# Patient Record
Sex: FEMALE | Race: WHITE | NOT HISPANIC OR LATINO | Employment: UNEMPLOYED | URBAN - METROPOLITAN AREA
[De-identification: names, ages, dates, MRNs, and addresses within clinical notes are randomized per-mention and may not be internally consistent; named-entity substitution may affect disease eponyms.]

---

## 2017-12-31 ENCOUNTER — APPOINTMENT (OUTPATIENT)
Dept: RADIOLOGY | Facility: MEDICAL CENTER | Age: 27
DRG: 638 | End: 2017-12-31
Attending: EMERGENCY MEDICINE
Payer: MEDICAID

## 2017-12-31 ENCOUNTER — RESOLUTE PROFESSIONAL BILLING HOSPITAL PROF FEE (OUTPATIENT)
Dept: HOSPITALIST | Facility: MEDICAL CENTER | Age: 27
End: 2017-12-31
Payer: MEDICAID

## 2017-12-31 ENCOUNTER — HOSPITAL ENCOUNTER (INPATIENT)
Facility: MEDICAL CENTER | Age: 27
LOS: 3 days | DRG: 638 | End: 2018-01-03
Attending: EMERGENCY MEDICINE | Admitting: HOSPITALIST
Payer: MEDICAID

## 2017-12-31 PROBLEM — Z91.148 NONCOMPLIANCE WITH MEDICATIONS: Status: ACTIVE | Noted: 2017-12-31

## 2017-12-31 PROBLEM — E11.10 DKA (DIABETIC KETOACIDOSES): Status: ACTIVE | Noted: 2017-12-31

## 2017-12-31 PROBLEM — R00.0 TACHYCARDIA: Status: ACTIVE | Noted: 2017-12-31

## 2017-12-31 LAB
ALBUMIN SERPL BCP-MCNC: 3.5 G/DL (ref 3.2–4.9)
ALBUMIN SERPL BCP-MCNC: 4.5 G/DL (ref 3.2–4.9)
ALBUMIN/GLOB SERPL: 1.1 G/DL
ALBUMIN/GLOB SERPL: 1.5 G/DL
ALP SERPL-CCNC: 118 U/L (ref 30–99)
ALP SERPL-CCNC: 121 U/L (ref 30–99)
ALT SERPL-CCNC: 10 U/L (ref 2–50)
ALT SERPL-CCNC: 11 U/L (ref 2–50)
ANION GAP SERPL CALC-SCNC: 15 MMOL/L (ref 0–11.9)
ANION GAP SERPL CALC-SCNC: 18 MMOL/L (ref 0–11.9)
ANION GAP SERPL CALC-SCNC: 20 MMOL/L (ref 0–11.9)
APPEARANCE UR: CLEAR
AST SERPL-CCNC: 13 U/L (ref 12–45)
AST SERPL-CCNC: 15 U/L (ref 12–45)
BASOPHILS # BLD AUTO: 0.4 % (ref 0–1.8)
BASOPHILS # BLD AUTO: 0.5 % (ref 0–1.8)
BASOPHILS # BLD: 0.02 K/UL (ref 0–0.12)
BASOPHILS # BLD: 0.03 K/UL (ref 0–0.12)
BILIRUB SERPL-MCNC: 0.2 MG/DL (ref 0.1–1.5)
BILIRUB SERPL-MCNC: 0.3 MG/DL (ref 0.1–1.5)
BNP SERPL-MCNC: 3 PG/ML (ref 0–100)
BUN SERPL-MCNC: 10 MG/DL (ref 8–22)
BUN SERPL-MCNC: 11 MG/DL (ref 8–22)
BUN SERPL-MCNC: 8 MG/DL (ref 8–22)
CALCIUM SERPL-MCNC: 7.1 MG/DL (ref 8.5–10.5)
CALCIUM SERPL-MCNC: 8.1 MG/DL (ref 8.5–10.5)
CALCIUM SERPL-MCNC: 8.9 MG/DL (ref 8.5–10.5)
CHLORIDE SERPL-SCNC: 100 MMOL/L (ref 96–112)
CHLORIDE SERPL-SCNC: 104 MMOL/L (ref 96–112)
CHLORIDE SERPL-SCNC: 111 MMOL/L (ref 96–112)
CO2 SERPL-SCNC: 10 MMOL/L (ref 20–33)
CO2 SERPL-SCNC: 11 MMOL/L (ref 20–33)
CO2 SERPL-SCNC: 9 MMOL/L (ref 20–33)
COLOR UR AUTO: YELLOW
CREAT SERPL-MCNC: 0.7 MG/DL (ref 0.5–1.4)
CREAT SERPL-MCNC: 0.76 MG/DL (ref 0.5–1.4)
CREAT SERPL-MCNC: 1 MG/DL (ref 0.5–1.4)
DEPRECATED D DIMER PPP IA-ACNC: 211 NG/ML(D-DU)
EKG IMPRESSION: NORMAL
EOSINOPHIL # BLD AUTO: 0.01 K/UL (ref 0–0.51)
EOSINOPHIL # BLD AUTO: 0.01 K/UL (ref 0–0.51)
EOSINOPHIL NFR BLD: 0.2 % (ref 0–6.9)
EOSINOPHIL NFR BLD: 0.2 % (ref 0–6.9)
ERYTHROCYTE [DISTWIDTH] IN BLOOD BY AUTOMATED COUNT: 43.8 FL (ref 35.9–50)
ERYTHROCYTE [DISTWIDTH] IN BLOOD BY AUTOMATED COUNT: 43.8 FL (ref 35.9–50)
FLUAV RNA SPEC QL NAA+PROBE: POSITIVE
FLUBV RNA SPEC QL NAA+PROBE: NEGATIVE
GFR SERPL CREATININE-BSD FRML MDRD: >60 ML/MIN/1.73 M 2
GLOBULIN SER CALC-MCNC: 3.1 G/DL (ref 1.9–3.5)
GLOBULIN SER CALC-MCNC: 3.2 G/DL (ref 1.9–3.5)
GLUCOSE BLD-MCNC: 127 MG/DL (ref 65–99)
GLUCOSE BLD-MCNC: 152 MG/DL (ref 65–99)
GLUCOSE BLD-MCNC: 201 MG/DL (ref 65–99)
GLUCOSE SERPL-MCNC: 213 MG/DL (ref 65–99)
GLUCOSE SERPL-MCNC: 284 MG/DL (ref 65–99)
GLUCOSE SERPL-MCNC: 371 MG/DL (ref 65–99)
GLUCOSE UR QL STRIP.AUTO: 500 MG/DL
HCG UR QL: NEGATIVE
HCT VFR BLD AUTO: 42.6 % (ref 37–47)
HCT VFR BLD AUTO: 46.6 % (ref 37–47)
HGB BLD-MCNC: 13 G/DL (ref 12–16)
HGB BLD-MCNC: 14.3 G/DL (ref 12–16)
IMM GRANULOCYTES # BLD AUTO: 0.01 K/UL (ref 0–0.11)
IMM GRANULOCYTES # BLD AUTO: 0.01 K/UL (ref 0–0.11)
IMM GRANULOCYTES NFR BLD AUTO: 0.2 % (ref 0–0.9)
IMM GRANULOCYTES NFR BLD AUTO: 0.2 % (ref 0–0.9)
KETONES UR QL STRIP.AUTO: >=160 MG/DL
LEUKOCYTE ESTERASE UR QL STRIP.AUTO: NEGATIVE
LYMPHOCYTES # BLD AUTO: 1.49 K/UL (ref 1–4.8)
LYMPHOCYTES # BLD AUTO: 2.43 K/UL (ref 1–4.8)
LYMPHOCYTES NFR BLD: 27 % (ref 22–41)
LYMPHOCYTES NFR BLD: 45 % (ref 22–41)
MAGNESIUM SERPL-MCNC: 1.5 MG/DL (ref 1.5–2.5)
MAGNESIUM SERPL-MCNC: 1.7 MG/DL (ref 1.5–2.5)
MCH RBC QN AUTO: 25.4 PG (ref 27–33)
MCH RBC QN AUTO: 25.5 PG (ref 27–33)
MCHC RBC AUTO-ENTMCNC: 30.5 G/DL (ref 33.6–35)
MCHC RBC AUTO-ENTMCNC: 30.7 G/DL (ref 33.6–35)
MCV RBC AUTO: 82.8 FL (ref 81.4–97.8)
MCV RBC AUTO: 83.7 FL (ref 81.4–97.8)
MONOCYTES # BLD AUTO: 0.26 K/UL (ref 0–0.85)
MONOCYTES # BLD AUTO: 0.39 K/UL (ref 0–0.85)
MONOCYTES NFR BLD AUTO: 4.7 % (ref 0–13.4)
MONOCYTES NFR BLD AUTO: 7.2 % (ref 0–13.4)
NEUTROPHILS # BLD AUTO: 2.54 K/UL (ref 2–7.15)
NEUTROPHILS # BLD AUTO: 3.72 K/UL (ref 2–7.15)
NEUTROPHILS NFR BLD: 47 % (ref 44–72)
NEUTROPHILS NFR BLD: 67.4 % (ref 44–72)
NITRITE UR QL STRIP.AUTO: NEGATIVE
NRBC # BLD AUTO: 0 K/UL
NRBC # BLD AUTO: 0 K/UL
NRBC BLD-RTO: 0 /100 WBC
NRBC BLD-RTO: 0 /100 WBC
PH UR STRIP.AUTO: 5 [PH]
PHOSPHATE SERPL-MCNC: 2.4 MG/DL (ref 2.5–4.5)
PHOSPHATE SERPL-MCNC: 3.1 MG/DL (ref 2.5–4.5)
PLATELET # BLD AUTO: 238 K/UL (ref 164–446)
PLATELET # BLD AUTO: 263 K/UL (ref 164–446)
PMV BLD AUTO: 10 FL (ref 9–12.9)
PMV BLD AUTO: 9.5 FL (ref 9–12.9)
POTASSIUM SERPL-SCNC: 3.8 MMOL/L (ref 3.6–5.5)
POTASSIUM SERPL-SCNC: 4 MMOL/L (ref 3.6–5.5)
POTASSIUM SERPL-SCNC: 4.4 MMOL/L (ref 3.6–5.5)
PROT SERPL-MCNC: 6.7 G/DL (ref 6–8.2)
PROT SERPL-MCNC: 7.6 G/DL (ref 6–8.2)
PROT UR QL STRIP: 100 MG/DL
RBC # BLD AUTO: 5.09 M/UL (ref 4.2–5.4)
RBC # BLD AUTO: 5.63 M/UL (ref 4.2–5.4)
RBC UR QL AUTO: ABNORMAL
SODIUM SERPL-SCNC: 130 MMOL/L (ref 135–145)
SODIUM SERPL-SCNC: 133 MMOL/L (ref 135–145)
SODIUM SERPL-SCNC: 135 MMOL/L (ref 135–145)
SP GR UR: 1.02
TROPONIN I SERPL-MCNC: <0.01 NG/ML (ref 0–0.04)
TSH SERPL DL<=0.005 MIU/L-ACNC: 1.46 UIU/ML (ref 0.38–5.33)
WBC # BLD AUTO: 5.4 K/UL (ref 4.8–10.8)
WBC # BLD AUTO: 5.5 K/UL (ref 4.8–10.8)

## 2017-12-31 PROCEDURE — 84100 ASSAY OF PHOSPHORUS: CPT

## 2017-12-31 PROCEDURE — 700102 HCHG RX REV CODE 250 W/ 637 OVERRIDE(OP): Performed by: INTERNAL MEDICINE

## 2017-12-31 PROCEDURE — 99291 CRITICAL CARE FIRST HOUR: CPT | Performed by: HOSPITALIST

## 2017-12-31 PROCEDURE — A9270 NON-COVERED ITEM OR SERVICE: HCPCS | Performed by: INTERNAL MEDICINE

## 2017-12-31 PROCEDURE — 93005 ELECTROCARDIOGRAM TRACING: CPT | Performed by: EMERGENCY MEDICINE

## 2017-12-31 PROCEDURE — 83735 ASSAY OF MAGNESIUM: CPT

## 2017-12-31 PROCEDURE — 700111 HCHG RX REV CODE 636 W/ 250 OVERRIDE (IP): Performed by: HOSPITALIST

## 2017-12-31 PROCEDURE — 700105 HCHG RX REV CODE 258: Performed by: INTERNAL MEDICINE

## 2017-12-31 PROCEDURE — 700102 HCHG RX REV CODE 250 W/ 637 OVERRIDE(OP): Performed by: HOSPITALIST

## 2017-12-31 PROCEDURE — 93005 ELECTROCARDIOGRAM TRACING: CPT

## 2017-12-31 PROCEDURE — 700105 HCHG RX REV CODE 258: Performed by: HOSPITALIST

## 2017-12-31 PROCEDURE — 80053 COMPREHEN METABOLIC PANEL: CPT

## 2017-12-31 PROCEDURE — 36415 COLL VENOUS BLD VENIPUNCTURE: CPT

## 2017-12-31 PROCEDURE — 80048 BASIC METABOLIC PNL TOTAL CA: CPT

## 2017-12-31 PROCEDURE — 85025 COMPLETE CBC W/AUTO DIFF WBC: CPT

## 2017-12-31 PROCEDURE — 87502 INFLUENZA DNA AMP PROBE: CPT

## 2017-12-31 PROCEDURE — 84443 ASSAY THYROID STIM HORMONE: CPT

## 2017-12-31 PROCEDURE — 99291 CRITICAL CARE FIRST HOUR: CPT

## 2017-12-31 PROCEDURE — 700111 HCHG RX REV CODE 636 W/ 250 OVERRIDE (IP): Performed by: INTERNAL MEDICINE

## 2017-12-31 PROCEDURE — 83880 ASSAY OF NATRIURETIC PEPTIDE: CPT

## 2017-12-31 PROCEDURE — 770022 HCHG ROOM/CARE - ICU (200)

## 2017-12-31 PROCEDURE — 96360 HYDRATION IV INFUSION INIT: CPT

## 2017-12-31 PROCEDURE — 71020 DX-CHEST-2 VIEWS: CPT

## 2017-12-31 PROCEDURE — 83036 HEMOGLOBIN GLYCOSYLATED A1C: CPT

## 2017-12-31 PROCEDURE — 85379 FIBRIN DEGRADATION QUANT: CPT

## 2017-12-31 PROCEDURE — 700105 HCHG RX REV CODE 258: Performed by: EMERGENCY MEDICINE

## 2017-12-31 PROCEDURE — 81025 URINE PREGNANCY TEST: CPT

## 2017-12-31 PROCEDURE — 84484 ASSAY OF TROPONIN QUANT: CPT

## 2017-12-31 PROCEDURE — 81002 URINALYSIS NONAUTO W/O SCOPE: CPT

## 2017-12-31 PROCEDURE — 82962 GLUCOSE BLOOD TEST: CPT

## 2017-12-31 PROCEDURE — 94760 N-INVAS EAR/PLS OXIMETRY 1: CPT

## 2017-12-31 RX ORDER — MAGNESIUM SULFATE HEPTAHYDRATE 40 MG/ML
4 INJECTION, SOLUTION INTRAVENOUS EVERY 4 HOURS PRN
Status: COMPLETED | OUTPATIENT
Start: 2017-12-31 | End: 2017-12-31

## 2017-12-31 RX ORDER — SODIUM CHLORIDE 9 MG/ML
INJECTION, SOLUTION INTRAVENOUS CONTINUOUS
Status: DISCONTINUED | OUTPATIENT
Start: 2017-12-31 | End: 2018-01-02

## 2017-12-31 RX ORDER — SODIUM CHLORIDE 9 MG/ML
2000 INJECTION, SOLUTION INTRAVENOUS ONCE
Status: COMPLETED | OUTPATIENT
Start: 2017-12-31 | End: 2017-12-31

## 2017-12-31 RX ORDER — ACYCLOVIR 200 MG/1
200 CAPSULE ORAL
Status: DISCONTINUED | OUTPATIENT
Start: 2017-12-31 | End: 2017-12-31

## 2017-12-31 RX ORDER — DEXTROSE AND SODIUM CHLORIDE 5; .9 G/100ML; G/100ML
INJECTION, SOLUTION INTRAVENOUS CONTINUOUS
Status: DISCONTINUED | OUTPATIENT
Start: 2017-12-31 | End: 2018-01-02

## 2017-12-31 RX ORDER — ONDANSETRON 4 MG/1
4 TABLET, ORALLY DISINTEGRATING ORAL EVERY 4 HOURS PRN
Status: DISCONTINUED | OUTPATIENT
Start: 2017-12-31 | End: 2018-01-03 | Stop reason: HOSPADM

## 2017-12-31 RX ORDER — PROMETHAZINE HYDROCHLORIDE 25 MG/1
12.5-25 TABLET ORAL EVERY 4 HOURS PRN
Status: DISCONTINUED | OUTPATIENT
Start: 2017-12-31 | End: 2018-01-03 | Stop reason: HOSPADM

## 2017-12-31 RX ORDER — SODIUM CHLORIDE, SODIUM LACTATE, POTASSIUM CHLORIDE, CALCIUM CHLORIDE 600; 310; 30; 20 MG/100ML; MG/100ML; MG/100ML; MG/100ML
INJECTION, SOLUTION INTRAVENOUS ONCE
Status: COMPLETED | OUTPATIENT
Start: 2017-12-31 | End: 2017-12-31

## 2017-12-31 RX ORDER — AMOXICILLIN 250 MG
2 CAPSULE ORAL 2 TIMES DAILY
Status: DISCONTINUED | OUTPATIENT
Start: 2017-12-31 | End: 2018-01-03 | Stop reason: HOSPADM

## 2017-12-31 RX ORDER — OXYCODONE HYDROCHLORIDE 10 MG/1
10 TABLET ORAL
Status: DISCONTINUED | OUTPATIENT
Start: 2017-12-31 | End: 2018-01-03 | Stop reason: HOSPADM

## 2017-12-31 RX ORDER — POLYETHYLENE GLYCOL 3350 17 G/17G
1 POWDER, FOR SOLUTION ORAL
Status: DISCONTINUED | OUTPATIENT
Start: 2017-12-31 | End: 2018-01-03 | Stop reason: HOSPADM

## 2017-12-31 RX ORDER — MAGNESIUM SULFATE HEPTAHYDRATE 40 MG/ML
2 INJECTION, SOLUTION INTRAVENOUS
Status: COMPLETED | OUTPATIENT
Start: 2017-12-31 | End: 2017-12-31

## 2017-12-31 RX ORDER — PROMETHAZINE HYDROCHLORIDE 12.5 MG/1
12.5-25 SUPPOSITORY RECTAL EVERY 4 HOURS PRN
Status: DISCONTINUED | OUTPATIENT
Start: 2017-12-31 | End: 2018-01-03 | Stop reason: HOSPADM

## 2017-12-31 RX ORDER — DEXTROSE AND SODIUM CHLORIDE 10; .45 G/100ML; G/100ML
INJECTION, SOLUTION INTRAVENOUS CONTINUOUS
Status: DISCONTINUED | OUTPATIENT
Start: 2017-12-31 | End: 2018-01-02

## 2017-12-31 RX ORDER — BISACODYL 10 MG
10 SUPPOSITORY, RECTAL RECTAL
Status: DISCONTINUED | OUTPATIENT
Start: 2017-12-31 | End: 2018-01-03 | Stop reason: HOSPADM

## 2017-12-31 RX ORDER — POTASSIUM CHLORIDE 7.45 MG/ML
20 INJECTION INTRAVENOUS ONCE
Status: DISCONTINUED | OUTPATIENT
Start: 2017-12-31 | End: 2017-12-31

## 2017-12-31 RX ORDER — HEPARIN SODIUM 5000 [USP'U]/ML
5000 INJECTION, SOLUTION INTRAVENOUS; SUBCUTANEOUS EVERY 8 HOURS
Status: DISCONTINUED | OUTPATIENT
Start: 2017-12-31 | End: 2018-01-02

## 2017-12-31 RX ORDER — POTASSIUM CHLORIDE 7.45 MG/ML
10 INJECTION INTRAVENOUS
Status: COMPLETED | OUTPATIENT
Start: 2017-12-31 | End: 2017-12-31

## 2017-12-31 RX ORDER — VALACYCLOVIR HYDROCHLORIDE 500 MG/1
500 TABLET, FILM COATED ORAL 2 TIMES DAILY
Status: DISCONTINUED | OUTPATIENT
Start: 2017-12-31 | End: 2018-01-03 | Stop reason: HOSPADM

## 2017-12-31 RX ORDER — OXYCODONE HYDROCHLORIDE 5 MG/1
5 TABLET ORAL
Status: DISCONTINUED | OUTPATIENT
Start: 2017-12-31 | End: 2018-01-03 | Stop reason: HOSPADM

## 2017-12-31 RX ORDER — ONDANSETRON 2 MG/ML
4 INJECTION INTRAMUSCULAR; INTRAVENOUS EVERY 4 HOURS PRN
Status: DISCONTINUED | OUTPATIENT
Start: 2017-12-31 | End: 2018-01-03 | Stop reason: HOSPADM

## 2017-12-31 RX ADMIN — POTASSIUM CHLORIDE 10 MEQ: 7.46 INJECTION, SOLUTION INTRAVENOUS at 20:20

## 2017-12-31 RX ADMIN — HEPARIN SODIUM 5000 UNITS: 5000 INJECTION, SOLUTION INTRAVENOUS; SUBCUTANEOUS at 20:20

## 2017-12-31 RX ADMIN — SODIUM CHLORIDE, POTASSIUM CHLORIDE, SODIUM LACTATE AND CALCIUM CHLORIDE: 600; 310; 30; 20 INJECTION, SOLUTION INTRAVENOUS at 16:36

## 2017-12-31 RX ADMIN — SODIUM CHLORIDE 4 UNITS/HR: 9 INJECTION, SOLUTION INTRAVENOUS at 20:21

## 2017-12-31 RX ADMIN — MAGNESIUM SULFATE IN WATER 2 G: 40 INJECTION, SOLUTION INTRAVENOUS at 18:22

## 2017-12-31 RX ADMIN — VALACYCLOVIR 500 MG: 500 TABLET, FILM COATED ORAL at 20:46

## 2017-12-31 RX ADMIN — POTASSIUM CHLORIDE 10 MEQ: 7.46 INJECTION, SOLUTION INTRAVENOUS at 21:35

## 2017-12-31 RX ADMIN — DEXTROSE AND SODIUM CHLORIDE: 5; 900 INJECTION, SOLUTION INTRAVENOUS at 20:19

## 2017-12-31 RX ADMIN — SODIUM CHLORIDE 2000 ML: 9 INJECTION, SOLUTION INTRAVENOUS at 19:12

## 2017-12-31 RX ADMIN — SODIUM CHLORIDE 2000 ML: 9 INJECTION, SOLUTION INTRAVENOUS at 17:39

## 2017-12-31 RX ADMIN — DEXTROSE AND SODIUM CHLORIDE: 10; .45 INJECTION, SOLUTION INTRAVENOUS at 23:45

## 2017-12-31 ASSESSMENT — ENCOUNTER SYMPTOMS
BLURRED VISION: 0
DEPRESSION: 0
WHEEZING: 0
MYALGIAS: 0
HEARTBURN: 0
BRUISES/BLEEDS EASILY: 0
EYE DISCHARGE: 0
CHILLS: 0
FLANK PAIN: 0
CONSTIPATION: 0
DOUBLE VISION: 0
VOMITING: 1
NAUSEA: 1
FEVER: 0
ABDOMINAL PAIN: 1
WEAKNESS: 0
PALPITATIONS: 1
DIARRHEA: 0
SHORTNESS OF BREATH: 1
TINGLING: 0
COUGH: 1
DIZZINESS: 0

## 2017-12-31 ASSESSMENT — PAIN SCALES - GENERAL
PAINLEVEL_OUTOF10: 0

## 2017-12-31 ASSESSMENT — LIFESTYLE VARIABLES
DO YOU DRINK ALCOHOL: NO
DO YOU DRINK ALCOHOL: NO
SUBSTANCE_ABUSE: 0

## 2017-12-31 ASSESSMENT — COPD QUESTIONNAIRES
DO YOU EVER COUGH UP ANY MUCUS OR PHLEGM?: NO/ONLY WITH OCCASIONAL COLDS OR INFECTIONS
DURING THE PAST 4 WEEKS HOW MUCH DID YOU FEEL SHORT OF BREATH: NONE/LITTLE OF THE TIME
HAVE YOU SMOKED AT LEAST 100 CIGARETTES IN YOUR ENTIRE LIFE: NO/DON'T KNOW

## 2017-12-31 NOTE — ED NOTES
Patient walked to er [facility] area room 64 ready to be seen. Placed her in a gown, on spo2, auto blood pressure, heart monitor, gave warm blanket and call light

## 2017-12-31 NOTE — ED PROVIDER NOTES
"ED Provider Note    Scribed for Rajesh Malave D.O. by Jagdeep Adame. 12/31/2017  2:13 PM    Means of arrival: Walk-in  History obtained from: Patient  History limited by: None    CHIEF COMPLAINT  Chief Complaint   Patient presents with   • Palpitations     intermittent episodes since yesterday.        JORGE Jacinto is a 27 y.o. Female, with a history of diabetes, who presents to the Emergency Department for heart palpitations onset yesterday with associated shortness of breath. She says the palpitations cause a chest discomfort which she describes as \"compressing\" and she says her \"heart feels like it's about to burst.\" Her discomfort does not radiate to anywhere else and the episodes last for approximately 30 minutes. Her symptoms are mildly relieved by lying down and are exacerbated on exertion. She has had heart palpitations intermittently for the past 7 years but has not experienced the chest compression or shortness of breath before.   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 She was advised to take Aspirin to prevent clots due to the faulty valve, but she has not began taking it. She denies birth control usage as she had a tubal ligation. Patient also denies any history of anxiety or depression, but notes that she was recently sick with a cough and fever for 3 days.    Cardiac Risk Factors:  No Age > 65  No Aspirin use within 7 days  No prior history of coronary artery disease  Positive for diabetes  Positive for hyperlipidemia   No hypertension  Positive for obesity  No family history of coronary artery disease at a young age <54 yo  No tobacco use   No drugs (methamphetamine or cocaine)  No history of aortic aneurysm   No history of aortic dissection   No history of deep vein thrombosis or pulmonary embolism   No hormone replacement  No oral birth control     REVIEW OF SYSTEMS  Pertinent positives include heart palpitations, shortness of breath, chest " "discomfort, chest pressure. Pertinent negatives include no pain radiation.  E.    PAST MEDICAL HISTORY  Past Medical History:   Diagnosis Date   • Diabetes (CMS-[facility])        SURGICAL HISTORY  History reviewed. No pertinent surgical history.     SOCIAL HISTORY  Social History   Substance Use Topics   • Smoking status: Former Smoker   • Smokeless tobacco: Never Used   • Alcohol use No      History   Drug Use No       FAMILY HISTORY  History reviewed. No pertinent family history.    CURRENT MEDICATIONS  Home Medications     Reviewed by Edilia Brady (Pharmacy Tech) on 12/31/17 at 1638  Med List Status: Complete   Medication Last Dose Status   insulin 70/30 (HUMULIN,NOVOLIN) (70-30) 100 UNIT/ML Suspension 12/31/2017 Active                ALLERGIES  No Known Allergies    PHYSICAL EXAM  VITAL SIGNS: /89   Pulse 93   Temp 36.7 °C (98.1 °F)   Resp 19   Ht 1.626 m (5' 4\")   Wt 89 kg (196 lb 3.4 oz)   SpO2 98%   BMI 33.68 kg/m²     Nursing notes and vitals reviewed.  Constitutional: Well developed, Well nourished, No acute distress, Non-toxic appearance.   Eyes: PERRLA, EOMI, Conjunctiva normal, No discharge.   Cardiovascular: Normal heart rate, Normal rhythm, No murmurs, No rubs, No gallops.   Thorax & Lungs: No respiratory distress, No rales, No rhonchi, No wheezing, No chest tenderness.   Abdomen: Bowel sounds normal, Soft, No tenderness, No guarding, No rebound, No masses, No pulsatile masses.   Skin: Warm, Dry, No erythema, No rash.   Musculoskeletal: Intact distal pulses, No edema, No cyanosis, No clubbing. Good range of motion in all major joints. No tenderness to palpation or major deformities noted, no CVA tenderness, no midline back tenderness.   Neurologic: Alert & oriented x 3, Normal motor function, Normal sensory function, No focal deficits noted.  Psychiatric:Anxious affect    DIAGNOSTIC STUDIES/PROCEDURES    LABS  Results for orders placed or performed during the hospital encounter of " 12/31/17   CBC w/ Differential   Result Value Ref Range    WBC 5.5 4.8 - 10.8 K/uL    RBC 5.63 (H) 4.20 - 5.40 M/uL    Hemoglobin 14.3 12.0 - 16.0 g/dL    Hematocrit 46.6 37.0 - 47.0 %    MCV 82.8 81.4 - 97.8 fL    MCH 25.4 (L) 27.0 - 33.0 pg    MCHC 30.7 (L) 33.6 - 35.0 g/dL    RDW 43.8 35.9 - 50.0 fL    Platelet Count 263 164 - 446 K/uL    MPV 9.5 9.0 - 12.9 fL    Neutrophils-Polys 67.40 44.00 - 72.00 %    Lymphocytes 27.00 22.00 - 41.00 %    Monocytes 4.70 0.00 - 13.40 %    Eosinophils 0.20 0.00 - 6.90 %    Basophils 0.50 0.00 - 1.80 %    Immature Granulocytes 0.20 0.00 - 0.90 %    Nucleated RBC 0.00 /100 WBC    Neutrophils (Absolute) 3.72 2.00 - 7.15 K/uL    Lymphs (Absolute) 1.49 1.00 - 4.80 K/uL    Monos (Absolute) 0.26 0.00 - 0.85 K/uL    Eos (Absolute) 0.01 0.00 - 0.51 K/uL    Baso (Absolute) 0.03 0.00 - 0.12 K/uL    Immature Granulocytes (abs) 0.01 0.00 - 0.11 K/uL    NRBC (Absolute) 0.00 K/uL   Complete Metabolic Panel (CMP)   Result Value Ref Range    Sodium 130 (L) 135 - 145 mmol/L    Potassium 4.4 3.6 - 5.5 mmol/L    Chloride 100 96 - 112 mmol/L    Co2 10 (L) 20 - 33 mmol/L    Anion Gap 20.0 (H) 0.0 - 11.9    Glucose 371 (H) 65 - 99 mg/dL    Bun 11 8 - 22 mg/dL    Creatinine 1.00 0.50 - 1.40 mg/dL    Calcium 8.9 8.5 - 10.5 mg/dL    AST(SGOT) 15 12 - 45 U/L    ALT(SGPT) 10 2 - 50 U/L    Alkaline Phosphatase 118 (H) 30 - 99 U/L    Total Bilirubin 0.3 0.1 - 1.5 mg/dL    Albumin 4.5 3.2 - 4.9 g/dL    Total Protein 7.6 6.0 - 8.2 g/dL    Globulin 3.1 1.9 - 3.5 g/dL    A-G Ratio 1.5 g/dL   Troponin STAT   Result Value Ref Range    Troponin I <0.01 0.00 - 0.04 ng/mL   Btype Natriuretic Peptide   Result Value Ref Range    B Natriuretic Peptide 3 0 - 100 pg/mL   Magnesium   Result Value Ref Range    Magnesium 1.7 1.5 - 2.5 mg/dL   Phosphorus   Result Value Ref Range    Phosphorus 3.1 2.5 - 4.5 mg/dL   TSH   Result Value Ref Range    TSH 1.460 0.380 - 5.330 uIU/mL   D-DIMER   Result Value Ref Range    D-Dimer  Screen 211 <250 ng/mL(D-DU)   ESTIMATED GFR   Result Value Ref Range    GFR If African American >60 >60 mL/min/1.73 m 2    GFR If Non African American >60 >60 mL/min/1.73 m 2   INFLUENZA A/B BY PCR   Result Value Ref Range    Influenza virus A RNA POSITIVE (A) Negative    Influenza virus B, PCR Negative Negative   CBC with Differential   Result Value Ref Range    WBC 5.4 4.8 - 10.8 K/uL    RBC 5.09 4.20 - 5.40 M/uL    Hemoglobin 13.0 12.0 - 16.0 g/dL    Hematocrit 42.6 37.0 - 47.0 %    MCV 83.7 81.4 - 97.8 fL    MCH 25.5 (L) 27.0 - 33.0 pg    MCHC 30.5 (L) 33.6 - 35.0 g/dL    RDW 43.8 35.9 - 50.0 fL    Platelet Count 238 164 - 446 K/uL    MPV 10.0 9.0 - 12.9 fL    Neutrophils-Polys 47.00 44.00 - 72.00 %    Lymphocytes 45.00 (H) 22.00 - 41.00 %    Monocytes 7.20 0.00 - 13.40 %    Eosinophils 0.20 0.00 - 6.90 %    Basophils 0.40 0.00 - 1.80 %    Immature Granulocytes 0.20 0.00 - 0.90 %    Nucleated RBC 0.00 /100 WBC    Neutrophils (Absolute) 2.54 2.00 - 7.15 K/uL    Lymphs (Absolute) 2.43 1.00 - 4.80 K/uL    Monos (Absolute) 0.39 0.00 - 0.85 K/uL    Eos (Absolute) 0.01 0.00 - 0.51 K/uL    Baso (Absolute) 0.02 0.00 - 0.12 K/uL    Immature Granulocytes (abs) 0.01 0.00 - 0.11 K/uL    NRBC (Absolute) 0.00 K/uL   Comp Metabolic Panel with Phosphorus, Magnesium   Result Value Ref Range    Sodium 133 (L) 135 - 145 mmol/L    Potassium 3.8 3.6 - 5.5 mmol/L    Chloride 104 96 - 112 mmol/L    Co2 11 (L) 20 - 33 mmol/L    Anion Gap 18.0 (H) 0.0 - 11.9    Glucose 284 (H) 65 - 99 mg/dL    Bun 10 8 - 22 mg/dL    Creatinine 0.70 0.50 - 1.40 mg/dL    Calcium 8.1 (L) 8.5 - 10.5 mg/dL    AST(SGOT) 13 12 - 45 U/L    ALT(SGPT) 11 2 - 50 U/L    Alkaline Phosphatase 121 (H) 30 - 99 U/L    Total Bilirubin 0.2 0.1 - 1.5 mg/dL    Albumin 3.5 3.2 - 4.9 g/dL    Total Protein 6.7 6.0 - 8.2 g/dL    Globulin 3.2 1.9 - 3.5 g/dL    A-G Ratio 1.1 g/dL   Phosphorus   Result Value Ref Range    Phosphorus 2.4 (L) 2.5 - 4.5 mg/dL   Magnesium   Result  Value Ref Range    Magnesium 1.5 1.5 - 2.5 mg/dL   ESTIMATED GFR   Result Value Ref Range    GFR If African American >60 >60 mL/min/1.73 m 2    GFR If Non African American >60 >60 mL/min/1.73 m 2   POC UA   Result Value Ref Range    POC Color Yellow     POC Appearance Clear     POC Glucose 500 (A) Negative mg/dL    POC Ketones >=160 (A) Negative mg/dL    POC Specific Gravity 1.020 1.005 - 1.030    POC Blood Moderate (A) Negative    POC Urine PH 5.0 5.0 - 8.0    POC Protein 100 (A) Negative mg/dL    POC Nitrites Negative Negative    POC Leukocyte Esterase Negative Negative   POC URINE PREGNANCY   Result Value Ref Range    POC Urine Pregnancy Test Negative Negative   EKG (ER)   Result Value Ref Range    Report       [facility] Emergency Dept.    Test Date:  2017  Pt Name:    RADHA YEUNG                  Department: ER  MRN:        [id number]                      Room:  Gender:     Female                       Technician: [id number]  :        1990                   Requested By:ER TRIAGE PROTOCOL  Order #:    [id number]                    Reading MD:    Measurements  Intervals                                Axis  Rate:       94                           P:          75  TN:         164                          QRS:        95  QRSD:       78                           T:          36  QT:         328  QTc:        411    Interpretive Statements  SINUS RHYTHM  CONSIDER LEFT ATRIAL ABNORMALITY  BORDERLINE RIGHT AXIS DEVIATION  BASELINE WANDER IN LEAD(S) II,III,aVL,aVF,V1,V2,V3,V4,V5,V6  No previous ECG available for comparison        All labs reviewed by me.    RADIOLOGY  DX-CHEST-2 VIEWS   Final Result      No active disease.         interpreted by the radiologist and reviewed by me.     COURSE & MEDICAL DECISION MAKING  Pertinent Labs & Imaging studies reviewed. (See chart for details)    2:13 PM - Patient seen and examined at bedside. Informed the patient that an EKG would assess for cardiac  abnormalities and advised follow up with a cardiologist in the future. Ordered DX chest, TSH, d-dimer, CBC, CMP, troponin, BNP, magnesium, phosphorous, urinalysis, and urine pregnancy to evaluate her symptoms. EKG results from triage can be seen above.    4:26 PM Patient's labs are consistent with DKA. Paged [facility].    4:35 PM [facility] is full at this time. Paged the hospitalist.     4:49 PM Consulted with Dr. Nelson for further evaluation and management.    5:26 PM Informed the patient of the decision to admit with concern for DKA. She agrees to the plan of care.     This is a charming 27 y.o. female that presents with diabetic ketoacidosis. The patient initially presents with palpitations as no evidence of super ventricular tachycardia, atrial fibrillation with RVR, WPW. Lab tests reveal evidence of diabetic ketoacidosis with anion gap of 20, CO2 of 10 as well as because of 284 bolus. The patient has no active severe he cannot instability, she has no evidence of acute coronary syndrome, she has a negative troponin and a suspected myocardial infarction, she has a negative d-dimer and I do not expect the patient to be having a pulmonary embolism as she is not hypoxic, tachypneic and has low pretest probability for risk factors. The patient does have influenza a bladder symptoms are greater than 2 days and I'll not be treating her currently. The patient has no evidence of overwhelming infection. The patient is admitted to the ICU for further evaluation and management.    DISPOSITION:  Patient will be admitted to Dr. Ball in guarded condition.     FINAL IMPRESSION  Diabetic ketoacidosis  Influenza     Jagdeep FISHMAN (Arsen), am scribing for, and in the presence of, Rajesh Malave D.O    Electronically signed by: Jagdeep Adame (Arsen), 12/31/2017    Rajesh FISHMAN D.O. personally performed the services described in this documentation, as scribed by Jagdeep Adame in my presence, and it  is both accurate and complete.    The note accurately reflects work and decisions made by me.  Rajesh Malave  12/31/2017  9:05 PM

## 2017-12-31 NOTE — ED NOTES
Chief Complaint   Patient presents with   • Palpitations     intermittent episodes since yesterday.      EKG completed prior to triage. VSS. Explained triage process, to waiting room. Asked to inform RN if questions or concerns arise.

## 2018-01-01 PROBLEM — R00.0 TACHYCARDIA: Status: RESOLVED | Noted: 2017-12-31 | Resolved: 2018-01-01

## 2018-01-01 PROBLEM — J11.1 INFLUENZA: Status: ACTIVE | Noted: 2018-01-01

## 2018-01-01 PROBLEM — E83.39 HYPOPHOSPHATEMIA: Status: ACTIVE | Noted: 2018-01-01

## 2018-01-01 LAB
ALBUMIN SERPL BCP-MCNC: 3 G/DL (ref 3.2–4.9)
ALBUMIN/GLOB SERPL: 1.4 G/DL
ALP SERPL-CCNC: 92 U/L (ref 30–99)
ALT SERPL-CCNC: 8 U/L (ref 2–50)
ANION GAP SERPL CALC-SCNC: 10 MMOL/L (ref 0–11.9)
ANION GAP SERPL CALC-SCNC: 13 MMOL/L (ref 0–11.9)
ANION GAP SERPL CALC-SCNC: 7 MMOL/L (ref 0–11.9)
ANION GAP SERPL CALC-SCNC: 8 MMOL/L (ref 0–11.9)
ANISOCYTOSIS BLD QL SMEAR: ABNORMAL
AST SERPL-CCNC: 11 U/L (ref 12–45)
B-OH-BUTYR SERPL-MCNC: 1.23 MMOL/L (ref 0.02–0.27)
B-OH-BUTYR SERPL-MCNC: 1.74 MMOL/L (ref 0.02–0.27)
BASOPHILS # BLD AUTO: 0 % (ref 0–1.8)
BASOPHILS # BLD: 0 K/UL (ref 0–0.12)
BILIRUB SERPL-MCNC: 0.2 MG/DL (ref 0.1–1.5)
BUN SERPL-MCNC: 3 MG/DL (ref 8–22)
BUN SERPL-MCNC: 5 MG/DL (ref 8–22)
BUN SERPL-MCNC: 6 MG/DL (ref 8–22)
BUN SERPL-MCNC: 7 MG/DL (ref 8–22)
BURR CELLS BLD QL SMEAR: NORMAL
CALCIUM SERPL-MCNC: 6.7 MG/DL (ref 8.5–10.5)
CALCIUM SERPL-MCNC: 7.1 MG/DL (ref 8.5–10.5)
CALCIUM SERPL-MCNC: 7.5 MG/DL (ref 8.5–10.5)
CALCIUM SERPL-MCNC: 7.6 MG/DL (ref 8.5–10.5)
CHLORIDE SERPL-SCNC: 109 MMOL/L (ref 96–112)
CHLORIDE SERPL-SCNC: 109 MMOL/L (ref 96–112)
CHLORIDE SERPL-SCNC: 112 MMOL/L (ref 96–112)
CHLORIDE SERPL-SCNC: 113 MMOL/L (ref 96–112)
CHLORIDE SERPL-SCNC: 114 MMOL/L (ref 96–112)
CHLORIDE SERPL-SCNC: 114 MMOL/L (ref 96–112)
CHOLEST SERPL-MCNC: 126 MG/DL (ref 100–199)
CO2 SERPL-SCNC: 12 MMOL/L (ref 20–33)
CO2 SERPL-SCNC: 12 MMOL/L (ref 20–33)
CO2 SERPL-SCNC: 13 MMOL/L (ref 20–33)
CO2 SERPL-SCNC: 15 MMOL/L (ref 20–33)
CO2 SERPL-SCNC: 16 MMOL/L (ref 20–33)
CO2 SERPL-SCNC: 16 MMOL/L (ref 20–33)
CREAT SERPL-MCNC: 0.47 MG/DL (ref 0.5–1.4)
CREAT SERPL-MCNC: 0.48 MG/DL (ref 0.5–1.4)
CREAT SERPL-MCNC: 0.49 MG/DL (ref 0.5–1.4)
CREAT SERPL-MCNC: 0.56 MG/DL (ref 0.5–1.4)
CREAT SERPL-MCNC: 0.56 MG/DL (ref 0.5–1.4)
CREAT SERPL-MCNC: 0.57 MG/DL (ref 0.5–1.4)
EOSINOPHIL # BLD AUTO: 0 K/UL (ref 0–0.51)
EOSINOPHIL NFR BLD: 0 % (ref 0–6.9)
ERYTHROCYTE [DISTWIDTH] IN BLOOD BY AUTOMATED COUNT: 42.7 FL (ref 35.9–50)
EST. AVERAGE GLUCOSE BLD GHB EST-MCNC: 286 MG/DL
GFR SERPL CREATININE-BSD FRML MDRD: >60 ML/MIN/1.73 M 2
GLOBULIN SER CALC-MCNC: 2.2 G/DL (ref 1.9–3.5)
GLUCOSE BLD-MCNC: 100 MG/DL (ref 65–99)
GLUCOSE BLD-MCNC: 104 MG/DL (ref 65–99)
GLUCOSE BLD-MCNC: 109 MG/DL (ref 65–99)
GLUCOSE BLD-MCNC: 109 MG/DL (ref 65–99)
GLUCOSE BLD-MCNC: 115 MG/DL (ref 65–99)
GLUCOSE BLD-MCNC: 118 MG/DL (ref 65–99)
GLUCOSE BLD-MCNC: 128 MG/DL (ref 65–99)
GLUCOSE BLD-MCNC: 129 MG/DL (ref 65–99)
GLUCOSE BLD-MCNC: 130 MG/DL (ref 65–99)
GLUCOSE BLD-MCNC: 131 MG/DL (ref 65–99)
GLUCOSE BLD-MCNC: 169 MG/DL (ref 65–99)
GLUCOSE BLD-MCNC: 170 MG/DL (ref 65–99)
GLUCOSE BLD-MCNC: 174 MG/DL (ref 65–99)
GLUCOSE BLD-MCNC: 225 MG/DL (ref 65–99)
GLUCOSE BLD-MCNC: 235 MG/DL (ref 65–99)
GLUCOSE BLD-MCNC: 236 MG/DL (ref 65–99)
GLUCOSE BLD-MCNC: 237 MG/DL (ref 65–99)
GLUCOSE BLD-MCNC: 245 MG/DL (ref 65–99)
GLUCOSE BLD-MCNC: 86 MG/DL (ref 65–99)
GLUCOSE BLD-MCNC: 90 MG/DL (ref 65–99)
GLUCOSE BLD-MCNC: 93 MG/DL (ref 65–99)
GLUCOSE BLD-MCNC: 96 MG/DL (ref 65–99)
GLUCOSE SERPL-MCNC: 120 MG/DL (ref 65–99)
GLUCOSE SERPL-MCNC: 129 MG/DL (ref 65–99)
GLUCOSE SERPL-MCNC: 136 MG/DL (ref 65–99)
GLUCOSE SERPL-MCNC: 150 MG/DL (ref 65–99)
GLUCOSE SERPL-MCNC: 247 MG/DL (ref 65–99)
GLUCOSE SERPL-MCNC: 96 MG/DL (ref 65–99)
HBA1C MFR BLD: 11.6 % (ref 0–5.6)
HCT VFR BLD AUTO: 35.3 % (ref 37–47)
HDLC SERPL-MCNC: 23 MG/DL
HGB BLD-MCNC: 11.2 G/DL (ref 12–16)
LDLC SERPL CALC-MCNC: 75 MG/DL
LYMPHOCYTES # BLD AUTO: 3 K/UL (ref 1–4.8)
LYMPHOCYTES NFR BLD: 66.7 % (ref 22–41)
MAGNESIUM SERPL-MCNC: 1.6 MG/DL (ref 1.5–2.5)
MAGNESIUM SERPL-MCNC: 1.8 MG/DL (ref 1.5–2.5)
MANUAL DIFF BLD: NORMAL
MCH RBC QN AUTO: 25.8 PG (ref 27–33)
MCHC RBC AUTO-ENTMCNC: 31.7 G/DL (ref 33.6–35)
MCV RBC AUTO: 81.3 FL (ref 81.4–97.8)
MICROCYTES BLD QL SMEAR: ABNORMAL
MONOCYTES # BLD AUTO: 0.2 K/UL (ref 0–0.85)
MONOCYTES NFR BLD AUTO: 4.5 % (ref 0–13.4)
MORPHOLOGY BLD-IMP: NORMAL
NEUTROPHILS # BLD AUTO: 1.3 K/UL (ref 2–7.15)
NEUTROPHILS NFR BLD: 28.8 % (ref 44–72)
NRBC # BLD AUTO: 0 K/UL
NRBC BLD-RTO: 0 /100 WBC
PHOSPHATE SERPL-MCNC: 2.4 MG/DL (ref 2.5–4.5)
PHOSPHATE SERPL-MCNC: <1 MG/DL (ref 2.5–4.5)
PLATELET # BLD AUTO: 216 K/UL (ref 164–446)
PLATELET BLD QL SMEAR: NORMAL
PMV BLD AUTO: 9.5 FL (ref 9–12.9)
POIKILOCYTOSIS BLD QL SMEAR: NORMAL
POTASSIUM SERPL-SCNC: 3 MMOL/L (ref 3.6–5.5)
POTASSIUM SERPL-SCNC: 3.1 MMOL/L (ref 3.6–5.5)
POTASSIUM SERPL-SCNC: 3.4 MMOL/L (ref 3.6–5.5)
POTASSIUM SERPL-SCNC: 3.6 MMOL/L (ref 3.6–5.5)
POTASSIUM SERPL-SCNC: 3.7 MMOL/L (ref 3.6–5.5)
POTASSIUM SERPL-SCNC: 4 MMOL/L (ref 3.6–5.5)
PROT SERPL-MCNC: 5.2 G/DL (ref 6–8.2)
RBC # BLD AUTO: 4.34 M/UL (ref 4.2–5.4)
RBC BLD AUTO: PRESENT
SODIUM SERPL-SCNC: 131 MMOL/L (ref 135–145)
SODIUM SERPL-SCNC: 132 MMOL/L (ref 135–145)
SODIUM SERPL-SCNC: 136 MMOL/L (ref 135–145)
SODIUM SERPL-SCNC: 137 MMOL/L (ref 135–145)
SODIUM SERPL-SCNC: 138 MMOL/L (ref 135–145)
SODIUM SERPL-SCNC: 139 MMOL/L (ref 135–145)
TRIGL SERPL-MCNC: 140 MG/DL (ref 0–149)
WBC # BLD AUTO: 4.5 K/UL (ref 4.8–10.8)

## 2018-01-01 PROCEDURE — 700111 HCHG RX REV CODE 636 W/ 250 OVERRIDE (IP): Performed by: INTERNAL MEDICINE

## 2018-01-01 PROCEDURE — 80061 LIPID PANEL: CPT

## 2018-01-01 PROCEDURE — 700101 HCHG RX REV CODE 250: Performed by: INTERNAL MEDICINE

## 2018-01-01 PROCEDURE — 82962 GLUCOSE BLOOD TEST: CPT | Mod: 91

## 2018-01-01 PROCEDURE — 85007 BL SMEAR W/DIFF WBC COUNT: CPT

## 2018-01-01 PROCEDURE — 90686 IIV4 VACC NO PRSV 0.5 ML IM: CPT | Performed by: INTERNAL MEDICINE

## 2018-01-01 PROCEDURE — 700102 HCHG RX REV CODE 250 W/ 637 OVERRIDE(OP): Performed by: HOSPITALIST

## 2018-01-01 PROCEDURE — 82010 KETONE BODYS QUAN: CPT | Mod: 91

## 2018-01-01 PROCEDURE — 700102 HCHG RX REV CODE 250 W/ 637 OVERRIDE(OP): Performed by: INTERNAL MEDICINE

## 2018-01-01 PROCEDURE — 85027 COMPLETE CBC AUTOMATED: CPT

## 2018-01-01 PROCEDURE — 80053 COMPREHEN METABOLIC PANEL: CPT

## 2018-01-01 PROCEDURE — 90732 PPSV23 VACC 2 YRS+ SUBQ/IM: CPT | Performed by: INTERNAL MEDICINE

## 2018-01-01 PROCEDURE — 80048 BASIC METABOLIC PNL TOTAL CA: CPT | Mod: 91

## 2018-01-01 PROCEDURE — 3E0234Z INTRODUCTION OF SERUM, TOXOID AND VACCINE INTO MUSCLE, PERCUTANEOUS APPROACH: ICD-10-PCS | Performed by: INTERNAL MEDICINE

## 2018-01-01 PROCEDURE — 99233 SBSQ HOSP IP/OBS HIGH 50: CPT | Performed by: HOSPITALIST

## 2018-01-01 PROCEDURE — 700105 HCHG RX REV CODE 258: Performed by: INTERNAL MEDICINE

## 2018-01-01 PROCEDURE — A9270 NON-COVERED ITEM OR SERVICE: HCPCS | Performed by: HOSPITALIST

## 2018-01-01 PROCEDURE — A9270 NON-COVERED ITEM OR SERVICE: HCPCS | Performed by: INTERNAL MEDICINE

## 2018-01-01 PROCEDURE — 83735 ASSAY OF MAGNESIUM: CPT

## 2018-01-01 PROCEDURE — 84100 ASSAY OF PHOSPHORUS: CPT | Mod: 91

## 2018-01-01 PROCEDURE — 700105 HCHG RX REV CODE 258: Performed by: HOSPITALIST

## 2018-01-01 PROCEDURE — 700111 HCHG RX REV CODE 636 W/ 250 OVERRIDE (IP): Performed by: HOSPITALIST

## 2018-01-01 PROCEDURE — 90471 IMMUNIZATION ADMIN: CPT

## 2018-01-01 PROCEDURE — 700101 HCHG RX REV CODE 250: Performed by: HOSPITALIST

## 2018-01-01 PROCEDURE — 770022 HCHG ROOM/CARE - ICU (200)

## 2018-01-01 RX ORDER — ACETAMINOPHEN 325 MG/1
650 TABLET ORAL EVERY 4 HOURS PRN
Status: DISCONTINUED | OUTPATIENT
Start: 2018-01-01 | End: 2018-01-03 | Stop reason: HOSPADM

## 2018-01-01 RX ORDER — MORPHINE SULFATE 4 MG/ML
1 INJECTION, SOLUTION INTRAMUSCULAR; INTRAVENOUS EVERY 4 HOURS PRN
Status: DISCONTINUED | OUTPATIENT
Start: 2018-01-01 | End: 2018-01-01

## 2018-01-01 RX ORDER — MAGNESIUM SULFATE HEPTAHYDRATE 40 MG/ML
2 INJECTION, SOLUTION INTRAVENOUS ONCE
Status: COMPLETED | OUTPATIENT
Start: 2018-01-01 | End: 2018-01-01

## 2018-01-01 RX ORDER — SODIUM CHLORIDE, SODIUM GLUCONATE, SODIUM ACETATE, POTASSIUM CHLORIDE, AND MAGNESIUM CHLORIDE 526; 502; 368; 37; 30 MG/100ML; MG/100ML; MG/100ML; MG/100ML; MG/100ML
1000 INJECTION, SOLUTION INTRAVENOUS ONCE
Status: DISCONTINUED | OUTPATIENT
Start: 2018-01-01 | End: 2018-01-02

## 2018-01-01 RX ORDER — POTASSIUM CHLORIDE 7.45 MG/ML
10 INJECTION INTRAVENOUS
Status: COMPLETED | OUTPATIENT
Start: 2018-01-01 | End: 2018-01-02

## 2018-01-01 RX ORDER — OSELTAMIVIR PHOSPHATE 75 MG/1
75 CAPSULE ORAL EVERY 12 HOURS
Status: DISCONTINUED | OUTPATIENT
Start: 2018-01-01 | End: 2018-01-03 | Stop reason: HOSPADM

## 2018-01-01 RX ORDER — POTASSIUM CHLORIDE 7.45 MG/ML
10 INJECTION INTRAVENOUS
Status: COMPLETED | OUTPATIENT
Start: 2018-01-01 | End: 2018-01-01

## 2018-01-01 RX ORDER — IBUPROFEN 800 MG/1
400 TABLET ORAL EVERY 6 HOURS PRN
Status: DISCONTINUED | OUTPATIENT
Start: 2018-01-01 | End: 2018-01-03 | Stop reason: HOSPADM

## 2018-01-01 RX ADMIN — POTASSIUM CHLORIDE 10 MEQ: 7.46 INJECTION, SOLUTION INTRAVENOUS at 04:19

## 2018-01-01 RX ADMIN — VALACYCLOVIR 500 MG: 500 TABLET, FILM COATED ORAL at 20:21

## 2018-01-01 RX ADMIN — POTASSIUM PHOSPHATE, MONOBASIC AND POTASSIUM PHOSPHATE, DIBASIC 30 MMOL: 224; 236 INJECTION, SOLUTION INTRAVENOUS at 12:10

## 2018-01-01 RX ADMIN — DEXTROSE AND SODIUM CHLORIDE: 10; .45 INJECTION, SOLUTION INTRAVENOUS at 18:53

## 2018-01-01 RX ADMIN — POTASSIUM PHOSPHATE, MONOBASIC AND POTASSIUM PHOSPHATE, DIBASIC 30 MMOL: 224; 236 INJECTION, SOLUTION INTRAVENOUS at 06:37

## 2018-01-01 RX ADMIN — POTASSIUM CHLORIDE 10 MEQ: 7.46 INJECTION, SOLUTION INTRAVENOUS at 03:17

## 2018-01-01 RX ADMIN — SODIUM CHLORIDE 2.5 UNITS/HR: 9 INJECTION, SOLUTION INTRAVENOUS at 20:32

## 2018-01-01 RX ADMIN — POTASSIUM CHLORIDE 10 MEQ: 7.46 INJECTION, SOLUTION INTRAVENOUS at 22:00

## 2018-01-01 RX ADMIN — OSELTAMIVIR PHOSPHATE 75 MG: 75 CAPSULE ORAL at 19:40

## 2018-01-01 RX ADMIN — OXYCODONE HYDROCHLORIDE 5 MG: 5 TABLET ORAL at 19:41

## 2018-01-01 RX ADMIN — STANDARDIZED SENNA CONCENTRATE AND DOCUSATE SODIUM 2 TABLET: 8.6; 5 TABLET, FILM COATED ORAL at 08:50

## 2018-01-01 RX ADMIN — POTASSIUM CHLORIDE 10 MEQ: 7.46 INJECTION, SOLUTION INTRAVENOUS at 05:39

## 2018-01-01 RX ADMIN — POTASSIUM CHLORIDE 10 MEQ: 7.46 INJECTION, SOLUTION INTRAVENOUS at 06:28

## 2018-01-01 RX ADMIN — MAGNESIUM SULFATE IN WATER 2 G: 40 INJECTION, SOLUTION INTRAVENOUS at 08:50

## 2018-01-01 RX ADMIN — INFLUENZA A VIRUS A/MICHIGAN/45/2015 X-275 (H1N1) ANTIGEN (FORMALDEHYDE INACTIVATED), INFLUENZA A VIRUS A/HONG KONG/4801/2014 X-263B (H3N2) ANTIGEN (FORMALDEHYDE INACTIVATED), INFLUENZA B VIRUS B/PHUKET/3073/2013 ANTIGEN (FORMALDEHYDE INACTIVATED), AND INFLUENZA B VIRUS B/BRISBANE/60/2008 ANTIGEN (FORMALDEHYDE INACTIVATED) 0.5 ML: 15; 15; 15; 15 INJECTION, SUSPENSION INTRAMUSCULAR at 08:46

## 2018-01-01 RX ADMIN — POTASSIUM CHLORIDE 10 MEQ: 7.46 INJECTION, SOLUTION INTRAVENOUS at 22:53

## 2018-01-01 RX ADMIN — VALACYCLOVIR 500 MG: 500 TABLET, FILM COATED ORAL at 08:50

## 2018-01-01 RX ADMIN — ACETAMINOPHEN 650 MG: 325 TABLET, FILM COATED ORAL at 15:04

## 2018-01-01 RX ADMIN — POTASSIUM CHLORIDE 10 MEQ: 7.46 INJECTION, SOLUTION INTRAVENOUS at 20:35

## 2018-01-01 RX ADMIN — OSELTAMIVIR PHOSPHATE 75 MG: 75 CAPSULE ORAL at 08:46

## 2018-01-01 RX ADMIN — PNEUMOCOCCAL VACCINE POLYVALENT 25 MCG
25; 25; 25; 25; 25; 25; 25; 25; 25; 25; 25; 25; 25; 25; 25; 25; 25; 25; 25; 25; 25; 25; 25 INJECTION, SOLUTION INTRAMUSCULAR; SUBCUTANEOUS at 08:47

## 2018-01-01 RX ADMIN — HEPARIN SODIUM 5000 UNITS: 5000 INJECTION, SOLUTION INTRAVENOUS; SUBCUTANEOUS at 14:03

## 2018-01-01 RX ADMIN — IBUPROFEN 400 MG: 800 TABLET, FILM COATED ORAL at 13:26

## 2018-01-01 RX ADMIN — DEXTROSE AND SODIUM CHLORIDE: 10; .45 INJECTION, SOLUTION INTRAVENOUS at 08:45

## 2018-01-01 RX ADMIN — POTASSIUM CHLORIDE 10 MEQ: 7.46 INJECTION, SOLUTION INTRAVENOUS at 23:31

## 2018-01-01 ASSESSMENT — PATIENT HEALTH QUESTIONNAIRE - PHQ9
1. LITTLE INTEREST OR PLEASURE IN DOING THINGS: NOT AT ALL
SUM OF ALL RESPONSES TO PHQ9 QUESTIONS 1 AND 2: 0
2. FEELING DOWN, DEPRESSED, IRRITABLE, OR HOPELESS: NOT AT ALL
SUM OF ALL RESPONSES TO PHQ QUESTIONS 1-9: 0

## 2018-01-01 ASSESSMENT — COPD QUESTIONNAIRES
DO YOU EVER COUGH UP ANY MUCUS OR PHLEGM?: NO/ONLY WITH OCCASIONAL COLDS OR INFECTIONS
DURING THE PAST 4 WEEKS HOW MUCH DID YOU FEEL SHORT OF BREATH: NONE/LITTLE OF THE TIME
COPD SCREENING SCORE: 2
HAVE YOU SMOKED AT LEAST 100 CIGARETTES IN YOUR ENTIRE LIFE: YES

## 2018-01-01 ASSESSMENT — PAIN SCALES - GENERAL
PAINLEVEL_OUTOF10: 0
PAINLEVEL_OUTOF10: 7
PAINLEVEL_OUTOF10: 0
PAINLEVEL_OUTOF10: 3
PAINLEVEL_OUTOF10: 7
PAINLEVEL_OUTOF10: 0

## 2018-01-01 ASSESSMENT — LIFESTYLE VARIABLES
EVER_SMOKED: YES
EVER_SMOKED: YES
ALCOHOL_USE: NO

## 2018-01-01 ASSESSMENT — ENCOUNTER SYMPTOMS
COUGH: 1
WEIGHT LOSS: 0
CHILLS: 0
BACK PAIN: 0
NECK PAIN: 0
SPUTUM PRODUCTION: 1
WHEEZING: 0

## 2018-01-01 NOTE — PROGRESS NOTES
Asha from Lab called with critical result of CO2 of 9 at 2200. Critical lab result read back to Asha from lab.   This critical lab result is within parameters established by  for this patient. Will continue to draw BMP Q4 hours and trend results based off of the DKA protocol.

## 2018-01-01 NOTE — DIETARY
Nutrition: Day 1 of admit.  28 yo female admitted for DKA and is currently NPO.  Consult received for diabetes meal planning education. Will follow up prior to discharge for education.  Request glycohemoglobin lab for better assessment.

## 2018-01-01 NOTE — PROGRESS NOTES
Patient admitted to room S-109 from ED.  Patient awake and alert.  IV fluid bolus infusing, blood sugar = 245, will recheck once IV fluid bolus complete per pharmasist.    12 Hour chart check, 2 RN skin check completed.

## 2018-01-01 NOTE — PROGRESS NOTES
0430: Paged Dr. Brooks regarding patient's most recent CMP. Anion gap is 8.0, CO2 is 15 and most recent fsbg was 86.  Insulin gtt is currently running at 4 units/hr and D10 0.45% NS is running at 125 mL/hr.    0445: Dr. Brooks at bedside. Received orders to reduce insulin gtt to 2 units/hr and keep IV maintenance fluids at the same rate of 125 mL/hr.

## 2018-01-01 NOTE — PROGRESS NOTES
[facility] Hospitalist Progress Note    Date of Service: 2018    Chief Complaint  27 y.o. female admitted 2017 with palpitations and fast heart rate, found to be in DKA and with influenza infection    Interval Problem Update  In ICU overnight  On DKA protocol, FSBG's and anion gap improved but bicarb still only 15  Tachycardia improved, now in SR HR 80's, SBP's ~110's  NPO, Mild abdominal pain and nausea, no emesis  Endorses generalized malaise and body ache    Consultants/Specialty  Critical Care    Disposition  Keep in ICU until off insulin drip    Patient discussed on rounds with intensivist, RN, respiratory therapy, and pharmacy.      Review of Systems   Constitutional: Negative for chills and weight loss.   Respiratory: Positive for cough and sputum production. Negative for wheezing.    Musculoskeletal: Negative for back pain and neck pain.   Skin: Negative for itching and rash.      Physical Exam  Laboratory/Imaging   Hemodynamics  Temp (24hrs), Av.9 °C (98.4 °F), Min:36.3 °C (97.4 °F), Max:37.1 °C (98.8 °F)   Temperature: 37 °C (98.6 °F)  Pulse  Av.6  Min: 68  Max: 117 Heart Rate (Monitored): 78  Blood Pressure: 111/75, NIBP: 103/68      Respiratory      Respiration: (!) 31, Pulse Oximetry: 96 %        RUL Breath Sounds: Clear, RML Breath Sounds: Clear, RLL Breath Sounds: Diminished, SANA Breath Sounds: Clear, LLL Breath Sounds: Diminished    Fluids    Intake/Output Summary (Last 24 hours) at 18 0707  Last data filed at 18 0600   Gross per 24 hour   Intake          5893.27 ml   Output             1350 ml   Net          4543.27 ml       Nutrition  Orders Placed This Encounter   Procedures   • Diet NPO     Standing Status:   Standing     Number of Occurrences:   1     Order Specific Question:   Restrict to:     Answer:   Strict [1]     Physical Exam   Constitutional: She is oriented to person, place, and time. She appears well-developed and well-nourished.   HENT:   Head: Normocephalic  and atraumatic.   Eyes: Conjunctivae and EOM are normal. Right eye exhibits no discharge. Left eye exhibits no discharge.   Cardiovascular: Normal rate, regular rhythm and intact distal pulses.    No murmur heard.  Pulmonary/Chest: Effort normal and breath sounds normal. No respiratory distress. She has no wheezes.   Abdominal: Soft. Bowel sounds are normal. She exhibits no distension. There is no tenderness. There is no rebound.   Musculoskeletal: Normal range of motion. She exhibits no edema.   Neurological: She is alert and oriented to person, place, and time. No cranial nerve deficit.   Skin: Skin is warm and dry. She is not diaphoretic. No erythema.   Psychiatric: She has a normal mood and affect.       Recent Labs      12/31/17   1440  12/31/17   1815  01/01/18   0335   WBC  5.5  5.4  4.5*   RBC  5.63*  5.09  4.34   HEMOGLOBIN  14.3  13.0  11.2*   HEMATOCRIT  46.6  42.6  35.3*   MCV  82.8  83.7  81.3*   MCH  25.4*  25.5*  25.8*   MCHC  30.7*  30.5*  31.7*   RDW  43.8  43.8  42.7   PLATELETCT  263  238  216   MPV  9.5  10.0  9.5     Recent Labs      12/31/17   2125  01/01/18   0145  01/01/18   0335   SODIUM  135  136  137   POTASSIUM  4.0  3.0*  3.4*   CHLORIDE  111  114*  114*   CO2  9*  12*  15*   GLUCOSE  213*  129*  96   BUN  8  7*  6*   CREATININE  0.76  0.48*  0.56   CALCIUM  7.1*  6.7*  7.5*         Recent Labs      12/31/17   1440   BNPBTYPENAT  3     Recent Labs      01/01/18   0335   TRIGLYCERIDE  140   HDL  23*   LDL  75          Assessment/Plan     * DKA (diabetic ketoacidoses) (CMS-[facility])- (present on admission)   Assessment & Plan    Severe acidosis  Likely due to non-compliance/difficulty obtaining medications and influenza  On DKA protocol, titrating insulin drip based on serial FSBG's and BMP's                Hypophosphatemia- (present on admission)   Assessment & Plan    Critically low hypophosphatemia  Discussed on rounds with Dr. Dent, 60mmol K phos ordered        Influenza- (present on  admission)   Assessment & Plan    Tamiflu        Uncontrolled diabetes mellitus (CMS-HCC)- (present on admission)   Assessment & Plan    Complications include peripheral neuropathy  Check HbA1c          Tachycardia- (present on admission)   Assessment & Plan    Likely due to dehydration/DKA  Improvd        Noncompliance with medications- (present on admission)   Assessment & Plan    Needs social work and diabetes education when stable            Reviewed items::  Labs reviewed and Medications reviewed  Contreras catheter::  No Contreras  DVT prophylaxis pharmacological::  Heparin  Antibiotics:  Treating active infection/contamination beyond 24 hours perioperative coverage

## 2018-01-01 NOTE — PROGRESS NOTES
Dr. Dent updated on pt's FSBG and BMP result.  Order received to increase insulin to 2units/hr and decrease D10 1/2NS to 75 ml/hr

## 2018-01-01 NOTE — CONSULTS
DATE OF SERVICE:  12/31/2017    CHIEF COMPLAINT:  Elevated blood sugar, nausea, vomiting.    HISTORY OF PRESENT ILLNESS:  This is a 27-year-old white female with a history   of type 1 diabetes mellitus diagnosed at age 9, abnormal valve disorder, I   believe ASD, that has not been surgically corrected that she noticed   intermittent palpitations and shortness of breath over the last couple of   days.  The patient states that her daughter is also ill and she may have   influenza.  The patient has not been following closely with her primary care   physician, who is on a 70/30 regimen and it is felt that her blood sugars are   out of control.  The patient has been taking blood sugars occasionally that   have been in the 200 range and recently has been noted to have a hemoglobin   A1c that was over 9.  She denies any other retinopathy.  She thinks she may   have some degree of early kidney disease diagnosed and states that she   occasionally has some peripheral neuropathic-like pain.     In the Emergency room, currently has not been receiving insulin infusion notably, but IV   fluids have been given additional 2 liters ordered by myself.    Critical care was   asked to assist in her management.    PAST MEDICAL HISTORY:  1.  Type 1 diabetes mellitus.  2.  Probable ASD.    PAST SURGICAL HISTORY:  No surgeries.    MEDICATIONS:  The patient was taking insulin 70/30.    SOCIAL HISTORY:  She was a former smoker, but has quit.  No significant   alcohol abuse or use.  She has 2 children.  She had improvement in her   diabetes during her gestation as they were closely following her blood sugars.    FAMILY HISTORY:  Denies significant medical history within the family.    REVIEW OF SYSTEMS:  Reviewed and otherwise negative except described above in   the HPI.    PHYSICAL EXAMINATION:  VITAL SIGNS:  On current exam in the ER, her temperature was 98.1 with a heart   rate of 117, blood pressure 132/72 with a saturation of 98%, and  respirations   are 17.  HEENT:  Her pupils are equal and reactive to light.  She wears glasses.  She   has no icterus.  She has no nystagmus.  Nose shows no epistaxis.  Oropharynx   is slightly dry.  She does have a right upper lip cold sore noted.  NECK:  Shows no obvious JVD or adenopathy.  CARDIOVASCULAR:  Heart was irregular.  Normal S1, S2.  No obvious murmur, rub   or gallop.  LUNGS:  Revealed breath sounds equal bilaterally.  No wheezing or rhonchi.  ABDOMEN:  Soft, nontender.  No hepatosplenomegaly.  She is moderately obese.    She has no tenderness to palpation.  She has abdominal striae from previous   pregnancies.  EXTREMITIES:  Revealed no significant ulcerations of her feet.  No   onychomycosis.  She has no lesions of her hands or fingers.  SKIN:  Warm upper and lower extremities.  She has good capillary refill.  NEUROLOGIC:  She had no obvious sensory dysesthesias to light touch.  She had   normal reflexes and no evidence of clonus.  There are no signs of cerebellar   dysfunction.    LABORATORY DATA:  White cell count 5.5, H and H is 14 and 46.6 with a platelet   count of 263.  Sodium 130, potassium is 4.0, chloride was 100, CO2 of 10,   glucose 371, BUN of 11, creatinine 1, ALT of 10, AST of 15, ALP of 118,   glucose of 371.  BNP was only 3, troponin was less than 0.01.  Urinalysis   performed revealed high specific gravity of 1.020 with a pH of 5, glucose of   greater than 500 and ketones were greater than 160, protein of 100.  TSH was   1.46.  She did have a negative influenza B, but positive influenza A.  Chest   x-ray, two views performed, reviewed personally that showed calcified   granuloma right upper lobe.  She had no pulmonary infiltrates.  Cardiac   silhouette was normal.  She had slight hilar prominence noted as well.    Otherwise, no obvious significant infiltrates.    Her other lab data includes EKG, which I reviewed personally that shows the   following:  Sinus rhythm with rate 94, W[place]  interval 164 with QRS duration of   78, QTC of 411.  She had slight poor R-wave progression.  Slight low voltage   in the lateral leads 1 and aVL.  There was no obvious ST or T-wave changes of   acute ischemia noted as well.    ASSESSMENT:  1.  Acute diabetic ketoacidosis.  The patient overall has had poor control   with elevated hemoglobin A1c over 9 recently.  She should likely be   transitioned off of 70/30 and consider long acting insulin as well as a more   regimented sliding scale.  2.  Influenza A positivity.  3.  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  4.  Moderate obesity.  5.  History of probable atrial septal defect murmur.    PLAN:  1.  The patient is admitted to the ICU.  I agree with additional IV fluids.  I   have given a 2 liter bolus in addition to her current diabetic ketoacidosis   regimen.  We would also continue with electrolyte replacement per protocol.  2.  Diabetic educator is recommended.  3.  We would start either famciclovir or acyclovir for cold sore.  Also,   topical Valtrex may be helpful.  Also Tamiflu.  4.  Follow up hemoglobin A1c as planned.     The patient remains critically ill.    Critical care time so far is 35 minutes not including procedures.       ____________________________________     DO ADDIS Thompson / ABEL    DD:  12/31/2017 19:53:34  DT:  12/31/2017 22:30:19    D#:  [id number]  Job#:  [id number]

## 2018-01-01 NOTE — CARE PLAN
Problem: Safety  Goal: Will remain free from injury  Bed in lowest locked position. Call light within reach. Patient oriented to room. Personal belongings within reach.     Problem: Discharge Barriers/Planning  Goal: Patient's continuum of care needs will be met    Intervention: Assess potential discharge barriers on admission and throughout hospital stay  Collaborate with MD regarding post hospital diabetic medications.

## 2018-01-01 NOTE — ASSESSMENT & PLAN NOTE
Complications include peripheral neuropathy  Diabetes education consulted.  Add metformin due to weight of 83 kg and Lantus 30 units daily.  HbA1c is 11.6

## 2018-01-01 NOTE — H&P
Hospital Medicine History and Physical    Date of Service  12/31/2017    Chief Complaint  Chief Complaint   Patient presents with   • Palpitations     intermittent episodes since yesterday.        History of Presenting Illness  27 y.o. female who presented 12/31/2017 with Intermittent palpitations. She has a history of diabetes noticed intermittent palpitations or shortness of breath worse when sitting up better when lying flat she's had heart palpitations intermittently for the past 7 years has not expressed any chest pain or shortness of breath deformities. Patient stated she was diagnosed with a heart valve abnormality she has not been treated for previously. She is advised to go aspirin to prevent clots she has not been taking it. She also knows she was recently sick with upper respiratory infection for 3 days cough associated with fever as well. She also is a diabetic type I diagnosed at the age of 9 supposed to be on insulin but does not follow with a primary care physician has been using her mom's insulin and treating her blood sugars as needed. She admits to sometimes not using insulin and not checking her blood sugars frequently. She cannot remember the last time she went to her primary care physician. She's been having nausea and vomiting associated with abdominal pain for the last few days as well.   Primary Care Physician  No primary care provider on file.    Consultants  None    Code Status  FUll    Review of Systems  Review of Systems   Constitutional: Negative for chills and fever.   HENT: Positive for congestion. Negative for hearing loss and tinnitus.    Eyes: Negative for blurred vision, double vision and discharge.   Respiratory: Positive for cough and shortness of breath. Negative for wheezing.    Cardiovascular: Positive for palpitations. Negative for chest pain and leg swelling.   Gastrointestinal: Positive for abdominal pain, nausea and vomiting. Negative for constipation, diarrhea and  heartburn.   Genitourinary: Negative for dysuria, flank pain and urgency.   Musculoskeletal: Negative for joint pain and myalgias.   Skin: Negative for rash.   Neurological: Negative for dizziness, tingling and weakness.   Endo/Heme/Allergies: Does not bruise/bleed easily.   Psychiatric/Behavioral: Negative for depression and substance abuse.        Past Medical History  Past Medical History:   Diagnosis Date   • Diabetes (CMS-[facility])        Surgical History  No surgeries in past    Medications  No current facility-administered medications on file prior to encounter.      No current outpatient prescriptions on file prior to encounter.       Family History  History reviewed. No pertinent family history.    Social History  Social History   Substance Use Topics   • Smoking status: Former Smoker   • Smokeless tobacco: Never Used   • Alcohol use No       Allergies  No Known Allergies     Physical Exam  Laboratory   Hemodynamics  Temp (24hrs), Av.7 °C (98.1 °F), Min:36.7 °C (98.1 °F), Max:36.7 °C (98.1 °F)   Temperature: 36.7 °C (98.1 °F)  Pulse  Av.7  Min: 81  Max: 117 Heart Rate (Monitored): 96  Blood Pressure: 131/89, NIBP: 132/73      Respiratory      Respiration: 17, Pulse Oximetry: 98 %             Physical Exam   Constitutional: She is oriented to person, place, and time. She appears well-developed and well-nourished. No distress.   HENT:   Head: Normocephalic and atraumatic.   Eyes: Conjunctivae and EOM are normal. Pupils are equal, round, and reactive to light.   Neck: Normal range of motion. Neck supple. No JVD present.   Cardiovascular: Normal rate, regular rhythm, normal heart sounds and intact distal pulses.    No murmur heard.  Pulmonary/Chest: Effort normal and breath sounds normal. No respiratory distress.   Abdominal: Soft. Bowel sounds are normal. She exhibits no distension. There is no tenderness.   Musculoskeletal: Normal range of motion. She exhibits no edema.   Neurological: She is alert and  oriented to person, place, and time. No cranial nerve deficit. She exhibits normal muscle tone.   Skin: Skin is warm and dry. No erythema.   Psychiatric: She has a normal mood and affect. Her behavior is normal. Judgment and thought content normal.   Nursing note and vitals reviewed.      Recent Labs      12/31/17   1440   WBC  5.5   RBC  5.63*   HEMOGLOBIN  14.3   HEMATOCRIT  46.6   MCV  82.8   MCH  25.4*   MCHC  30.7*   RDW  43.8   PLATELETCT  263   MPV  9.5     Recent Labs      12/31/17   1440   SODIUM  130*   POTASSIUM  4.4   CHLORIDE  100   CO2  10*   GLUCOSE  371*   BUN  11   CREATININE  1.00   CALCIUM  8.9     Recent Labs      12/31/17   1440   ALTSGPT  10   ASTSGOT  15   ALKPHOSPHAT  118*   TBILIRUBIN  0.3   GLUCOSE  371*         Recent Labs      12/31/17   1440   BNPBTYPENAT  3         Lab Results   Component Value Date    TROPONINI <0.01 12/31/2017     Urinalysis:  No results found for: SPECGRAVITY, GLUCOSEUR, KETONES, NITRITE, WBCURINE, RBCURINE, BACTERIA, EPITHELCELL     Imaging  DX-CHEST-2 VIEWS [[id number]] Resulted: 12/31/17 1517   Order Status: Completed Updated: 12/31/17 1520   Narrative:       12/31/2017 3:12 PM    HISTORY/REASON FOR EXAM:  Chest Pain.      TECHNIQUE/EXAM DESCRIPTION AND NUMBER OF VIEWS:  Two views of the chest.    COMPARISON:  None.    FINDINGS:  The heart is normal in size.  No pulmonary infiltrates or consolidations are noted.  No pleural effusions are appreciated.  A calcified granuloma identified in the right upper lobe. No pneumothorax.   Impression:       No active disease.        Assessment/Plan     I anticipate this patient will require at least two midnights for appropriate medical management, necessitating inpatient admission.    * DKA (diabetic ketoacidoses) (CMS-[facility])   Assessment & Plan    Severe acidosis  DKA Protocol ordered  Likely 2/2 to medication noncompliance as well as recent URI  Will need diabetes education prior to discharge  Patient currently using moms  insulin to treat her high bs's  Gap 20, co2 10; monitor bmp's   Replace K when needed   IVF per protocol  IV insulin              Uncontrolled diabetes mellitus (CMS-[facility])   Assessment & Plan    Recheck a1c          Noncompliance with medications   Assessment & Plan    Needs social work and diabetes education when stable        Tachycardia   Assessment & Plan    Likely patients palpitations and 2/2 to patients dehydration            VTE prophylaxis: Heparin       I spent a total of 35 minutes of critical care time during this clinical encounter of which > 50% was devoted to counseling and coordinating care including review of records, pertinent lab data and studies, as well as discussing diagnostic evaluation and work up, planned therapeutic interventions and future disposition of care. Where indicated, the assessment and plan reflect discussion of patient with consultants, other healthcare providers, family members, and additional research needed to obtain further information in formulating the plan of care of this patient. This time includes no procedures or overlap with other providers. Using IV insulin and IV fluids to prevent end organ damage, patient very high risk for decompensation needs frequent monitoring.

## 2018-01-01 NOTE — PROGRESS NOTES
Pulmonary Critical Care Progress Note        Date of Service:  1/1/2018  Chief Complaint: Nausea/vomiting and abdominal pain    History of Present Illness: 27 y.o. female admitted for DKA and control of nausea/vomiting.     ROS:  Respiratory: negative, Cardiac: negative, GI: negative.  All other systems negative.    Interval Events:  24 hour interval history reviewed    - was off dka protocol, but AG opened and back on   - NPO currently   - insulin at 1-2 unit/hr   - D10   - BMP pending   - Phos < 1 today   - no CXR today, reviewed yesterday's and no obvious infiltrates    PFSH:  No change.    Respiratory:     Pulse Oximetry: 95 %    Exam: clear throughout, no wheezing noted  ImagingAvailable data reviewed         Invalid input(s): [id number]    HemoDynamics:  Pulse: 68, Heart Rate (Monitored): 69  Blood Pressure: 111/75, NIBP: 110/67       Exam: regular rate and rhythm, good cap refill, 2+ dpp=, no pedal edema  Imaging: Available data reviewed  Recent Labs      12/31/17   1440   TROPONINI  <0.01   BNPBTYPENAT  3       Neuro:  GCS         Exam: no focal deficits noted mental status intact Motor and sensory exam grossly intact follows commands, raises two fingers  Imaging: Available data reviewed    Fluids:  Intake/Output       12/30/17 0700 - 12/31/17 0659 (Not Admitted) 12/31/17 0700 - 01/01/18 0659 01/01/18 0700 - 01/02/18 0659      9870-4229 1187-6851 Total 8398-3843 9660-1227 Total 6879-5582 3933-6908 Total       Intake    I.V.  --  -- --  3000  2622.4 5622.4  --  -- --    Magnesium Sulfate Volume -- -- -- -- 50 50 -- -- --    Insulin Volume -- -- -- -- 122.4 122.4 -- -- --    IV Piggyback Volume (Potassium K Scale) -- -- -- -- 200 200 -- -- --    IV Volume (IVF Bolus) -- -- -- 3000 1000 4000 -- -- --    IV Volume (D5NS ) -- -- -- -- 625 625 -- -- --    IV Volume (D10 0.45% NS ) -- -- -- -- 625 625 -- -- --    Total Intake -- -- -- 3000 2622.4 5622.4 -- -- --       Output    Urine  --  -- --  --  5700 1350   --  -- --    Number of Times Voided -- -- -- -- 2 x 2 x -- -- --    Void (ml) -- -- -- -- 1350 1350 -- -- --    Total Output -- -- -- -- 1350 1350 -- -- --       Net I/O     -- -- -- 3000 1272.4 4272.4 -- -- --        Weight: 79.4 kg (175 lb)  Recent Labs      17   1440  17   17317   0335   SODIUM  130*  133*  135  136  137   POTASSIUM  4.4  3.8  4.0  3.0*  3.4*   CHLORIDE  100  104  111  114*  114*   CO2  10*  11*  9*  12*  15*   BUN  11  10  8  7*  6*   CREATININE  1.00  0.70  0.76  0.48*  0.56   MAGNESIUM  1.7  1.5   --   1.6   --    PHOSPHORUS  3.1  2.4*   --   <1.0*   --    CALCIUM  8.9  8.1*  7.1*  6.7*  7.5*       GI/Nutrition:  Exam: abdomen is soft and non-tender, normal active bowel sounds, without masses or organomegaly  Imaging: Available data reviewed  NPO  Liver Function  Recent Labs      17   1440  17   0335   ALTSGPT  10  11   --    --   8   ASTSGOT  15  13   --    --   11*   ALKPHOSPHAT  118*  121*   --    --   92   TBILIRUBIN  0.3  0.2   --    --   0.2   GLUCOSE  371*  284*  213*  129*  96       Heme:  Recent Labs      17   14417   18118   0335   RBC  5.63*  5.09  4.34   HEMOGLOBIN  14.3  13.0  11.2*   HEMATOCRIT  46.6  42.6  35.3*   PLATELETCT  263  238  216       Infectious Disease:  Temp  Av.8 °C (98.3 °F)  Min: 36.3 °C (97.4 °F)  Max: 37.1 °C (98.8 °F)  Micro: antibiotics reviewed and cultures reviewed  Recent Labs      17   1440  17   1739  17   1815  18   0335   WBC  5.5   --   5.4  4.5*   NEUTSPOLYS  67.40   --   47.00  28.80*   LYMPHOCYTES  27.00   --   45.00*  66.70*   MONOCYTES  4.70   --   7.20  4.50   EOSINOPHILS  0.20   --   0.20  0.00   BASOPHILS  0.50   --   0.40  0.00   ASTSGOT  15  13   --   11*   ALTSGPT  10  11   --   8   ALKPHOSPHAT  118*  121*   --   92   TBILIRUBIN  0.3  0.2   --   0.2     Current  Facility-Administered Medications   Medication Dose Frequency Provider Last Rate Last Dose   • Influenza Vaccine Quad pf injection 0.5 mL  0.5 mL Once Duke Brooks D.O.   Stopped at 01/01/18 0130   • pneumococcal vaccine (PNEUMOVAX-23) injection 25 mcg  0.5 mL Once Duke Brooks D.O.   Stopped at 01/01/18 0130   • potassium chloride in water (KCL) ivpb 10 mEq  10 mEq Q HOUR Duke Brooks D.O. 0 mL/hr at 01/01/18 0519 10 mEq at 01/01/18 0539   • Adult DKA potassium(K+) replacement scale  1 Each Q4HRS Brunilda Ling M.D.       • senna-docusate (PERICOLACE or SENOKOT S) 8.6-50 MG per tablet 2 Tab  2 Tab BID Brunilda Ling M.D.        And   • polyethylene glycol/lytes (MIRALAX) PACKET 1 Packet  1 Packet QDAY PRN Brunilda Ling M.D.        And   • magnesium hydroxide (MILK OF MAGNESIA) suspension 30 mL  30 mL QDAY PRN Brunilda Ling M.D.        And   • bisacodyl (DULCOLAX) suppository 10 mg  10 mg QDAY PRBERTO Ling M.D.       • heparin injection 5,000 Units  5,000 Units Q8HRS Brunilda Ling M.D.   5,000 Units at 12/31/17 2020   • Pharmacy Consult Request ...Pain Management Review   PRN Brunilda Ling M.D.        And   • oxycodone immediate-release (ROXICODONE) tablet 5 mg  5 mg Q3HRS PRBERTO Ling M.D.        And   • oxycodone immediate-release (ROXICODONE) tablet 10 mg  10 mg Q3HRS PRBERTO Ling M.D.        And   • HYDROmorphone (DILAUDID) injection 0.5 mg  0.5 mg Q3HRS PRBERTO Ling M.D.       • ondansetron (ZOFRAN) syringe/vial injection 4 mg  4 mg Q4HRS PRBERTO Ling M.D.       • ondansetron (ZOFRAN ODT) dispertab 4 mg  4 mg Q4HRS PRN Brunilda Ling M.D.       • promethazine (PHENERGAN) tablet 12.5-25 mg  12.5-25 mg Q4HRS PRN Brunilda Ling M.D.       • promethazine (PHENERGAN) suppository 12.5-25 mg  12.5-25 mg Q4HRS PRN Brunilda Ling M.D.       • prochlorperazine (COMPAZINE) injection 5-10 mg  5-10 mg Q4HRS PRN Brunilda Ling M.D.        • dextrose 10% and 0.45% NaCl infusion   Continuous Brunilda Ling M.D. 125 mL/hr at 12/31/17 2345     • MD ALERT-PHARMACY TO CONSULT FOR DKA MONITORING 1 Each  1 Each PRN Brunilda Ling M.D.       • potassium phosphates 30 mmol in  mL ivpb  30 mmol Once PRN Brunilda Ling M.D.        Or   • sodium phosphate 30 mmol in 1/2  mL ivpb  30 mmol Once PRN Brunilda Ling M.D.       • NS infusion   Continuous Brunilda Ling M.D.   Stopped at 12/31/17 1715   • D5 NS infusion   Continuous Brunilda Lnig M.D.   Stopped at 12/31/17 2345   • insulin regular human (HUMULIN/NOVOLIN R) 62.5 Units in  mL Infusion for DKA  4 Units/hr Continuous Brunilda Ling M.D. 4 mL/hr at 01/01/18 0543 1 Units/hr at 01/01/18 0543   • valacyclovir (VALTREX) caplet 500 mg  500 mg BID Duke Brooks D.OBrandon   500 mg at 12/31/17 2046     Last reviewed on 12/31/2017  4:38 PM by Danita Angel [facility]    Quality  Measures:  Medications reviewed, Labs reviewed and Radiology images reviewed  Contreras catheter: No Contreras      DVT Prophylaxis: Heparin  DVT prophylaxis - mechanical: SCDs  Ulcer prophylaxis: Not indicated          Problems/Plan:    Acute DKA   - cont DKA protocol until AG closes and serum bicarb > 19   - continuous insulin drip adjustments based on BMP   - cont D10   - NPO   - DM education  Influenza A   - cont RT protocol   - cont Tamiflu  Right Upper Lip cold sore/HSV-1   - cont valtrex  Hypophosphatemia   - replete as needed  Moderate Obesity  Hx of probable ASD    Discussed patient condition and risk of morbidity and/or mortality with RN, RT, Pharmacy, Patient and hospitalist.    The patient remains critically ill.  Critical care time = 32 minutes in directly providing and coordinating critical care and extensive data review.  No time overlap and excludes procedures.

## 2018-01-01 NOTE — CARE PLAN
Problem: Safety  Goal: Will remain free from injury    Intervention: Provide assistance with mobility  Pt educated on fall prevention measures.  Pt verbalized understanding and calls appropriately for assist.      Problem: Knowledge Deficit  Goal: Knowledge of the prescribed therapeutic regimen will improve    Intervention: Discuss information regarding therpeutic regimen and document in education  Discussed plan of care for today.  Pt verbalized understanding and participates in her care.

## 2018-01-01 NOTE — PROGRESS NOTES
Dr. Dent updated on pt's newest lab results.  Order received to increase D10 1/2NS to 100ml/hr and keep FSBG 100-250 and recheck BMP at 1600.

## 2018-01-01 NOTE — ASSESSMENT & PLAN NOTE
Admitted with a severe acidosis  Likely due to non-compliance/difficulty obtaining medications and influenza  Pregnancy test was negative.  s/p DKA protocol with titrating insulin drip based on glucose levels and BMP's  Anion gap and bicarb have normalized thus transition to Lantus and sliding scale.  She was using her mother's insulin at home. She has been on between 20 and 50 units of Lantus in the past.

## 2018-01-01 NOTE — PROGRESS NOTES
Dr. Brooks at bedside assessing patient. POC reviewed with patient. New orders received and implemented.

## 2018-01-01 NOTE — PROGRESS NOTES
Pt's 0805 BMP was posted under 0505 time slot.  Lab notified and they are working on fixing the time.

## 2018-01-02 LAB
ANION GAP SERPL CALC-SCNC: 6 MMOL/L (ref 0–11.9)
ANION GAP SERPL CALC-SCNC: 8 MMOL/L (ref 0–11.9)
ANION GAP SERPL CALC-SCNC: 8 MMOL/L (ref 0–11.9)
B-OH-BUTYR SERPL-MCNC: 0.61 MMOL/L (ref 0.02–0.27)
BUN SERPL-MCNC: <3 MG/DL (ref 8–22)
CALCIUM SERPL-MCNC: 6.9 MG/DL (ref 8.5–10.5)
CALCIUM SERPL-MCNC: 7.1 MG/DL (ref 8.5–10.5)
CALCIUM SERPL-MCNC: 8 MG/DL (ref 8.5–10.5)
CHLORIDE SERPL-SCNC: 112 MMOL/L (ref 96–112)
CHLORIDE SERPL-SCNC: 113 MMOL/L (ref 96–112)
CHLORIDE SERPL-SCNC: 113 MMOL/L (ref 96–112)
CO2 SERPL-SCNC: 15 MMOL/L (ref 20–33)
CO2 SERPL-SCNC: 17 MMOL/L (ref 20–33)
CO2 SERPL-SCNC: 20 MMOL/L (ref 20–33)
CREAT SERPL-MCNC: 0.39 MG/DL (ref 0.5–1.4)
CREAT SERPL-MCNC: 0.4 MG/DL (ref 0.5–1.4)
CREAT SERPL-MCNC: 0.51 MG/DL (ref 0.5–1.4)
GFR SERPL CREATININE-BSD FRML MDRD: >60 ML/MIN/1.73 M 2
GLUCOSE BLD-MCNC: 100 MG/DL (ref 65–99)
GLUCOSE BLD-MCNC: 142 MG/DL (ref 65–99)
GLUCOSE BLD-MCNC: 170 MG/DL (ref 65–99)
GLUCOSE BLD-MCNC: 322 MG/DL (ref 65–99)
GLUCOSE BLD-MCNC: 361 MG/DL (ref 65–99)
GLUCOSE BLD-MCNC: 84 MG/DL (ref 65–99)
GLUCOSE BLD-MCNC: 98 MG/DL (ref 65–99)
GLUCOSE SERPL-MCNC: 125 MG/DL (ref 65–99)
GLUCOSE SERPL-MCNC: 139 MG/DL (ref 65–99)
GLUCOSE SERPL-MCNC: 98 MG/DL (ref 65–99)
MAGNESIUM SERPL-MCNC: 1.9 MG/DL (ref 1.5–2.5)
PHOSPHATE SERPL-MCNC: 1.5 MG/DL (ref 2.5–4.5)
POTASSIUM SERPL-SCNC: 3.3 MMOL/L (ref 3.6–5.5)
POTASSIUM SERPL-SCNC: 3.5 MMOL/L (ref 3.6–5.5)
POTASSIUM SERPL-SCNC: 3.9 MMOL/L (ref 3.6–5.5)
SODIUM SERPL-SCNC: 136 MMOL/L (ref 135–145)
SODIUM SERPL-SCNC: 138 MMOL/L (ref 135–145)
SODIUM SERPL-SCNC: 138 MMOL/L (ref 135–145)

## 2018-01-02 PROCEDURE — 700105 HCHG RX REV CODE 258: Performed by: INTERNAL MEDICINE

## 2018-01-02 PROCEDURE — 700111 HCHG RX REV CODE 636 W/ 250 OVERRIDE (IP): Performed by: INTERNAL MEDICINE

## 2018-01-02 PROCEDURE — 700102 HCHG RX REV CODE 250 W/ 637 OVERRIDE(OP): Performed by: HOSPITALIST

## 2018-01-02 PROCEDURE — 700102 HCHG RX REV CODE 250 W/ 637 OVERRIDE(OP): Performed by: INTERNAL MEDICINE

## 2018-01-02 PROCEDURE — 83735 ASSAY OF MAGNESIUM: CPT

## 2018-01-02 PROCEDURE — 84100 ASSAY OF PHOSPHORUS: CPT

## 2018-01-02 PROCEDURE — 82962 GLUCOSE BLOOD TEST: CPT | Mod: 91

## 2018-01-02 PROCEDURE — A9270 NON-COVERED ITEM OR SERVICE: HCPCS | Performed by: INTERNAL MEDICINE

## 2018-01-02 PROCEDURE — 700111 HCHG RX REV CODE 636 W/ 250 OVERRIDE (IP): Performed by: HOSPITALIST

## 2018-01-02 PROCEDURE — 770022 HCHG ROOM/CARE - ICU (200)

## 2018-01-02 PROCEDURE — 99233 SBSQ HOSP IP/OBS HIGH 50: CPT | Performed by: HOSPITALIST

## 2018-01-02 PROCEDURE — 700101 HCHG RX REV CODE 250: Performed by: INTERNAL MEDICINE

## 2018-01-02 PROCEDURE — 80048 BASIC METABOLIC PNL TOTAL CA: CPT

## 2018-01-02 PROCEDURE — 82010 KETONE BODYS QUAN: CPT

## 2018-01-02 RX ORDER — INSULIN GLARGINE 100 [IU]/ML
10 INJECTION, SOLUTION SUBCUTANEOUS ONCE
Status: COMPLETED | OUTPATIENT
Start: 2018-01-02 | End: 2018-01-02

## 2018-01-02 RX ORDER — INSULIN GLARGINE 100 [IU]/ML
25 INJECTION, SOLUTION SUBCUTANEOUS EVERY EVENING
Status: DISCONTINUED | OUTPATIENT
Start: 2018-01-03 | End: 2018-01-02

## 2018-01-02 RX ORDER — SODIUM CHLORIDE, SODIUM GLUCONATE, SODIUM ACETATE, POTASSIUM CHLORIDE AND MAGNESIUM CHLORIDE 526; 502; 368; 37; 30 MG/100ML; MG/100ML; MG/100ML; MG/100ML; MG/100ML
1000 INJECTION, SOLUTION INTRAVENOUS ONCE
Status: COMPLETED | OUTPATIENT
Start: 2018-01-02 | End: 2018-01-02

## 2018-01-02 RX ORDER — DEXTROSE MONOHYDRATE 25 G/50ML
25 INJECTION, SOLUTION INTRAVENOUS
Status: DISCONTINUED | OUTPATIENT
Start: 2018-01-02 | End: 2018-01-03 | Stop reason: HOSPADM

## 2018-01-02 RX ORDER — INSULIN GLARGINE 100 [IU]/ML
20 INJECTION, SOLUTION SUBCUTANEOUS ONCE
Status: COMPLETED | OUTPATIENT
Start: 2018-01-02 | End: 2018-01-02

## 2018-01-02 RX ORDER — INSULIN GLARGINE 100 [IU]/ML
30 INJECTION, SOLUTION SUBCUTANEOUS EVERY EVENING
Status: DISCONTINUED | OUTPATIENT
Start: 2018-01-03 | End: 2018-01-03

## 2018-01-02 RX ORDER — POTASSIUM CHLORIDE 7.45 MG/ML
10 INJECTION INTRAVENOUS
Status: DISCONTINUED | OUTPATIENT
Start: 2018-01-02 | End: 2018-01-02

## 2018-01-02 RX ADMIN — SODIUM CHLORIDE, SODIUM GLUCONATE, SODIUM ACETATE, POTASSIUM CHLORIDE AND MAGNESIUM CHLORIDE 1000 ML: 526; 502; 368; 37; 30 INJECTION, SOLUTION INTRAVENOUS at 01:28

## 2018-01-02 RX ADMIN — POTASSIUM CHLORIDE 10 MEQ: 7.46 INJECTION, SOLUTION INTRAVENOUS at 07:50

## 2018-01-02 RX ADMIN — SODIUM CHLORIDE, SODIUM GLUCONATE, SODIUM ACETATE, POTASSIUM CHLORIDE AND MAGNESIUM CHLORIDE 1000 ML: 526; 502; 368; 37; 30 INJECTION, SOLUTION INTRAVENOUS at 04:39

## 2018-01-02 RX ADMIN — VALACYCLOVIR 500 MG: 500 TABLET, FILM COATED ORAL at 21:11

## 2018-01-02 RX ADMIN — ENOXAPARIN SODIUM 40 MG: 100 INJECTION SUBCUTANEOUS at 12:50

## 2018-01-02 RX ADMIN — INSULIN GLARGINE 20 UNITS: 100 INJECTION, SOLUTION SUBCUTANEOUS at 10:15

## 2018-01-02 RX ADMIN — INSULIN HUMAN 8 UNITS: 100 INJECTION, SOLUTION PARENTERAL at 17:38

## 2018-01-02 RX ADMIN — INSULIN GLARGINE 10 UNITS: 100 INJECTION, SOLUTION SUBCUTANEOUS at 12:50

## 2018-01-02 RX ADMIN — VALACYCLOVIR 500 MG: 500 TABLET, FILM COATED ORAL at 07:50

## 2018-01-02 RX ADMIN — POTASSIUM PHOSPHATE, MONOBASIC AND POTASSIUM PHOSPHATE, DIBASIC 30 MMOL: 224; 236 INJECTION, SOLUTION INTRAVENOUS at 09:36

## 2018-01-02 RX ADMIN — POTASSIUM CHLORIDE 10 MEQ: 7.46 INJECTION, SOLUTION INTRAVENOUS at 05:37

## 2018-01-02 RX ADMIN — INSULIN HUMAN 6 UNITS: 100 INJECTION, SOLUTION PARENTERAL at 21:11

## 2018-01-02 RX ADMIN — POTASSIUM CHLORIDE 10 MEQ: 7.46 INJECTION, SOLUTION INTRAVENOUS at 04:28

## 2018-01-02 RX ADMIN — DEXTROSE AND SODIUM CHLORIDE: 10; .45 INJECTION, SOLUTION INTRAVENOUS at 05:49

## 2018-01-02 RX ADMIN — OSELTAMIVIR PHOSPHATE 75 MG: 75 CAPSULE ORAL at 07:51

## 2018-01-02 RX ADMIN — POTASSIUM CHLORIDE 10 MEQ: 7.46 INJECTION, SOLUTION INTRAVENOUS at 06:42

## 2018-01-02 RX ADMIN — OSELTAMIVIR PHOSPHATE 75 MG: 75 CAPSULE ORAL at 21:11

## 2018-01-02 ASSESSMENT — PAIN SCALES - GENERAL
PAINLEVEL_OUTOF10: 0

## 2018-01-02 ASSESSMENT — ENCOUNTER SYMPTOMS
CHILLS: 0
FEVER: 0
COUGH: 1
DIARRHEA: 0
SHORTNESS OF BREATH: 0

## 2018-01-02 NOTE — CARE PLAN
Problem: Safety  Goal: Will remain free from injury    Intervention: Provide assistance with mobility  Patient has personal belongings within reach. Call light in reach. Bed in lowest locked position. Treaded socks on. Patient in room near nursing station.       Problem: Knowledge Deficit  Goal: Knowledge of disease process/condition, treatment plan, diagnostic tests, and medications will improve  Patient updated on plan of care. Questions answered and no new concerns at this time.

## 2018-01-02 NOTE — PROGRESS NOTES
Discussed pt's most recent lab with pharmacist.  Dr. Malloy at the bedside also.  Orders received.

## 2018-01-02 NOTE — DIETARY
Nutrition: Day 2 of admit.  26 yo female admitted for DKA and is currently NPO.  Consult received for diabetes meal planning education.  HgbA1C 11.6 and average daily glucose 286.   Will follow up prior to discharge for education.

## 2018-01-02 NOTE — PROGRESS NOTES
Pulmonary Critical Care Progress Note        Date of Service:  1/2/2018  Chief Complaint: Nausea/vomiting and abdominal pain    History of Present Illness: 27 y.o. female admitted for DKA and control of nausea/vomiting.     ROS:  Respiratory: negative, Cardiac: negative, GI: negative.  All other systems negative.    Interval Events:  24 hour interval history reviewed    - Still on insulin drip and D10   - Last CO2 at 17   - s/p plasmalyte x 2   - -120s   - SR 70s   - NPO   - Adequate UOP   - No respiratory issues   - No CXR today    Yesterday's Events:   - was off dka protocol, but AG opened and back on   - NPO currently   - insulin at 1-2 unit/hr   - D10   - BMP pending   - Phos < 1 today   - no CXR today, reviewed yesterday's and no obvious infiltrates    PFSH:  No change.    GEN: younger than stated age, pleasant/cooperative, smiling/cheery, healthy in appearance and non toxic  Skin: warm/dry, no rashes    Respiratory:     Pulse Oximetry: 94 %    Exam: clear throughout, no wheezing, no respiratory distress, speaking in full sentences  ImagingAvailable data reviewed     HemoDynamics:  Pulse: 70, Heart Rate (Monitored): 67  NIBP: 112/65       Exam: RRR, no murmur, 2+ dpp=, good cap refill, no pedal edema  Imaging: Available data reviewed  Recent Labs      12/31/17   1440   TROPONINI  <0.01   BNPBTYPENAT  3       Neuro:  GCS Total [place] Coma Score: 15       Exam:clear speech, symmetrical facial expressions, motor/sensory intact, walking around ICU room  Imaging: Available data reviewed    Fluids:  Intake/Output       12/31/17 0700 - 01/01/18 0659 01/01/18 0700 - 01/02/18 0659 01/02/18 0700 - 01/03/18 0659      3336-8684 1193-7704 Total 3279-5485 1601-2082 Total 6597-0907 7512-6892 Total       Intake    P.O.  --  -- --  60  -- 60  --  -- --    P.O. -- -- -- 60 -- 60 -- -- --    I.V.  3000  2893.3 5893.3  2278.4  2500 4778.4  --  -- --    Magnesium Sulfate Volume -- 50 50 -- -- -- -- -- --    Insulin Volume  -- 143.3 143.3 78.4 100 178.4 -- -- --    IV Piggyback Volume (Potassium K Scale) -- 200 200 -- 400 400 -- -- --    IV Volume (IVF Bolus) 3000 1000 4000 -- 1000 1000 -- -- --    IV Volume (D5NS ) -- 625 625 -- -- -- -- -- --    IV Volume (D10 0.45% NS ) --  1000 2200 -- -- --    IV Volume (Kphos) -- -- -- 1000 -- 1000 -- -- --    Total Intake 3000 2893.3 5893.3 2338.4 2500 4838.4 -- -- --       Output    Urine  --  1350 1350  1600  1900 3500  --  -- --    Number of Times Voided -- 2 x 2 x 2 x 2 x 4 x -- -- --    Void (ml) -- 1350 1350 1600 1900 3500 -- -- --    Stool  --  -- --  --  -- --  --  -- --    Number of Times Stooled -- -- -- -- 0 x 0 x -- -- --    Total Output -- 1350 1350 1600 1900 3500 -- -- --       Net I/O     3000 1543.3 4543.3 738.4 600 1338.4 -- -- --           Recent Labs      01/01/18   0145   01/01/18   0505   01/01/18   1928  01/02/18   0001  01/02/18   0245   SODIUM  136   < >  138   < >  139  136  138   POTASSIUM  3.0*   < >  4.0   < >  3.1*  3.5*  3.3*   CHLORIDE  114*   < >  112   < >  113*  113*  113*   CO2  12*   < >  13*   < >  16*  15*  17*   BUN  7*   < >  5*   < >  3*  <3*  <3*   CREATININE  0.48*   < >  0.47*   < >  0.49*  0.40*  0.39*   MAGNESIUM  1.6   --   1.8   --    --    --   1.9   PHOSPHORUS  <1.0*   --   2.4*   --    --    --   1.5*   CALCIUM  6.7*   < >  7.6*   < >  7.1*  6.9*  7.1*    < > = values in this interval not displayed.       GI/Nutrition:  Exam: (+)bs, soft, NT/ND, no masses  Imaging: Available data reviewed  NPO this morning-->transitioned to full liquid diet  Liver Function  Recent Labs      12/31/17   1440  12/31/17   1739   01/01/18   0335   01/01/18   1928  01/02/18   0001  01/02/18   0245   ALTSGPT  10  11   --   8   --    --    --    --    ASTSGOT  15  13   --   11*   --    --    --    --    ALKPHOSPHAT  118*  121*   --   92   --    --    --    --    TBILIRUBIN  0.3  0.2   --   0.2   --    --    --    --    GLUCOSE  371*  284*   < >  96   < >   120*  98  139*    < > = values in this interval not displayed.       Heme:  Recent Labs      17   1440  17   1815  18   0335   RBC  5.63*  5.09  4.34   HEMOGLOBIN  14.3  13.0  11.2*   HEMATOCRIT  46.6  42.6  35.3*   PLATELETCT  263  238  216       Infectious Disease:  Temp  Av.9 °C (98.4 °F)  Min: 36.2 °C (97.2 °F)  Max: 37.1 °C (98.8 °F)  Micro: reviewed  Recent Labs      17   1440  17   1739  17   1815  18   0335   WBC  5.5   --   5.4  4.5*   NEUTSPOLYS  67.40   --   47.00  28.80*   LYMPHOCYTES  27.00   --   45.00*  66.70*   MONOCYTES  4.70   --   7.20  4.50   EOSINOPHILS  0.20   --   0.20  0.00   BASOPHILS  0.50   --   0.40  0.00   ASTSGOT  15  13   --   11*   ALTSGPT  10  11   --   8   ALKPHOSPHAT  118*  121*   --   92   TBILIRUBIN  0.3  0.2   --   0.2     Current Facility-Administered Medications   Medication Dose Frequency Provider Last Rate Last Dose   • potassium chloride in water (KCL) ivpb 10 mEq  10 mEq Q HOUR Ezekiel Demarco Jr., D.O. 0 mL/hr at 18 0528 10 mEq at 18 0537   • Adult DKA potassium(K+) replacement scale  1 Each Q4HRS Brunilda Ling M.D.   1 Each at 18 0400   • oseltamivir (TAMIFLU) capsule 75 mg  75 mg Q12HRS GOYO MccainOBrandon   75 mg at 18 1940   • Respiratory Care per Protocol   Continuous RT Petra Dent M.D.       • ibuprofen (MOTRIN) tablet 400 mg  400 mg Q6HRS PRN Norman Malloy M.D.   400 mg at 18 1326   • acetaminophen (TYLENOL) tablet 650 mg  650 mg Q4HRS PRN Norman Malloy M.D.   650 mg at 18 1504   • senna-docusate (PERICOLACE or SENOKOT S) 8.6-50 MG per tablet 2 Tab  2 Tab BID Brunilda Ling M.D.   2 Tab at 18 0850    And   • polyethylene glycol/lytes (MIRALAX) PACKET 1 Packet  1 Packet QDAY PRBERTO Ling M.D.        And   • magnesium hydroxide (MILK OF MAGNESIA) suspension 30 mL  30 mL QDAY PRBERTO Ling M.D.        And   • bisacodyl (DULCOLAX) suppository 10 mg   10 mg QDAY PRN Brunilda Ling M.D.       • heparin injection 5,000 Units  5,000 Units Q8HRS Brunilda Ling M.D.   5,000 Units at 01/01/18 1403   • Pharmacy Consult Request ...Pain Management Review   PRN Brunilda Ling M.D.        And   • oxycodone immediate-release (ROXICODONE) tablet 5 mg  5 mg Q3HRS PRBERTO Ling M.D.   5 mg at 01/01/18 1941    And   • oxycodone immediate-release (ROXICODONE) tablet 10 mg  10 mg Q3HRS PRN Brunilda Ling M.D.        And   • HYDROmorphone (DILAUDID) injection 0.5 mg  0.5 mg Q3HRS PRBERTO Ling M.D.       • ondansetron (ZOFRAN) syringe/vial injection 4 mg  4 mg Q4HRS PRBERTO Ling M.D.       • ondansetron (ZOFRAN ODT) dispertab 4 mg  4 mg Q4HRS PRBERTO Ling M.D.       • promethazine (PHENERGAN) tablet 12.5-25 mg  12.5-25 mg Q4HRS PRBERTO Ling M.D.       • promethazine (PHENERGAN) suppository 12.5-25 mg  12.5-25 mg Q4HRS PRBERTO Ling M.D.       • prochlorperazine (COMPAZINE) injection 5-10 mg  5-10 mg Q4HRS PRBERTO Ling M.D.       • dextrose 10% and 0.45% NaCl infusion   Continuous Petra Dent M.D. 100 mL/hr at 01/02/18 0549     • MD ALERT-PHARMACY TO CONSULT FOR DKA MONITORING 1 Each  1 Each PRN Brunilda Ling M.D.       • NS infusion   Continuous Brunilda Ling M.D.   Stopped at 12/31/17 1715   • D5 NS infusion   Continuous Brunilda Ling M.D.   Stopped at 12/31/17 2345   • insulin regular human (HUMULIN/NOVOLIN R) 62.5 Units in  mL Infusion for DKA  4 Units/hr Continuous Brunilda Ling M.D. 10 mL/hr at 01/02/18 0300 2.5 Units/hr at 01/02/18 0300   • valacyclovir (VALTREX) caplet 500 mg  500 mg BID Duke Brooks D.O.   500 mg at 01/01/18 2021     Last reviewed on 12/31/2017  4:38 PM by Edilia Brady    Quality  Measures:  Medications reviewed, Labs reviewed and Radiology images reviewed  Contreras catheter: No Contreras      DVT Prophylaxis: Heparin  DVT prophylaxis - mechanical:  SCDs  Ulcer prophylaxis: Not indicated        Problems/Plan:    Acute DKA   - s/p DKA protocol   - start lantus 20 units with high ISS   - advance diet   - DM education  Influenza A   - cont RT protocol   - cont Tamiflu  Right Upper Lip cold sore/HSV-1   - cont valtrex  Hypophosphatemia   - replete as needed  Moderate Obesity  Hx of probable ASD    Discussed patient condition and risk of morbidity and/or mortality with RN, RT, Pharmacy, Patient and hospitalist.    [id number]

## 2018-01-02 NOTE — PROGRESS NOTES
Dr. Dent updated on pt's FSBG of 90.  Orders received for   -  Diabetic diet   -  20 units of Lantus   -  DC D10 1/2 NS and Insulin drip 2 hours after Lantus dose

## 2018-01-02 NOTE — PROGRESS NOTES
Dr. Demarco at bedside. Updated on patient's most recent BMP. CO2 of 17 and anion gap of 8. Received orders for another bolus of plasmalyte-a.

## 2018-01-02 NOTE — PROGRESS NOTES
2225: Paged Dr. Demarco regarding most recent fsbg of 90. Insulin gtt is at 2.5 units/hr, CO2 is 16 and anion gap is 10 as of 2000.     2230: Spoke with MD. New orders received and implemented.

## 2018-01-02 NOTE — PROGRESS NOTES
2330: Central supply contacted to tube plasmalyte 148. Per pharmacy, they do not stock this IV fluid.     (1/2/18) 0015: Spoke with Jasmeet in central supply and they do not stock the plasmalyte 148.      (1/2/18) 0030: Contacted pharmacy again to reverify if they stock plasmalyte 148 and they confirmed that they do not stock it and either does central.      (1/2/18) 0100: Spoke with Dr. Demarco to update him regarding the plasmalyte. He will reach out to pharmacy and get a new fluid orders at this time. MD was also notified of most recent BMP. CO2 15, Anion Gap 8, glucose 98. No new orders at this time.

## 2018-01-02 NOTE — CARE PLAN
Problem: Safety  Goal: Will remain free from injury    Intervention: Provide assistance with mobility  Pt ambulates without assist safely.      Problem: Pain Management  Goal: Pain level will decrease to patient's comfort goal    Intervention: Follow pain managment plan developed in collaboration with patient and Interdisciplinary Team  Pt denies having any pain at this moment

## 2018-01-02 NOTE — PROGRESS NOTES
Spoke with pharmacist Radha regarding BMP due for 0000.  Advised to still draw BMP for 0000 to obtain CO2 and anion gap results, but we will not be replacing K at that time. The patient is currently getting 40 mEq of K-rider that is running over 4 hours.

## 2018-01-02 NOTE — PROGRESS NOTES
[facility] Hospitalist Progress Note    Date of Service: 2018    Chief Complaint  27 y.o. female admitted 2017 with palpitations.    Interval Problem Update  Ms. Jacinto has a hx of IDDM since age 9 that has been using her mother's insulin and has struggled with compliance that developed URI symptoms. She presented to the ER where she was found to be in DKA. She has been admitted to the ICU for an IV insulin drip and fluids. Her influenza A is +.  She has been on Lantus in the past anywhere from 20 to 50 units daily but has run out and is taking her mother's insulin. Katrina states that she does not have doctor and is interested in establishing care. She is very anxious to eat. We had a long discussion about establishing care with a PCP and close outpatient follow up.  Consultants/Specialty  Critical care. I discussed her condition with Dr. Dent on ICU Hot Rounds.    Disposition  ICU        Review of Systems   Constitutional: Negative for chills and fever.   Respiratory: Positive for cough. Negative for shortness of breath.    Cardiovascular: Negative for chest pain and leg swelling.   Gastrointestinal: Negative for diarrhea.        +hungry and anxious to eat   All other systems reviewed and are negative.     Physical Exam  Laboratory/Imaging   Hemodynamics  Temp (24hrs), Av.9 °C (98.4 °F), Min:36.2 °C (97.2 °F), Max:37.1 °C (98.8 °F)   Temperature: 37 °C (98.6 °F)  Pulse  Av  Min: 63  Max: 117 Heart Rate (Monitored): 68  NIBP: 118/79      Respiratory      Respiration: 20, Pulse Oximetry: 95 %, O2 Daily Delivery Respiratory : Room Air with O2 Available        RUL Breath Sounds: Clear, RML Breath Sounds: Clear, RLL Breath Sounds: Diminished, SANA Breath Sounds: Clear, LLL Breath Sounds: Diminished    Fluids    Intake/Output Summary (Last 24 hours) at 18 0746  Last data filed at 18 0600   Gross per 24 hour   Intake          6288.37 ml   Output             3500 ml   Net          2788.37 ml        Nutrition  Orders Placed This Encounter   Procedures   • Diet NPO     Standing Status:   Standing     Number of Occurrences:   1     Order Specific Question:   Restrict to:     Answer:   Sips with Medications [3]     Physical Exam   Constitutional: She is oriented to person, place, and time. She appears well-developed and well-nourished. No distress.   Neck: Normal range of motion. Neck supple.   Cardiovascular: Normal rate and regular rhythm.    No murmur heard.  Pulmonary/Chest: Effort normal. No respiratory distress. She has no wheezes.   Abdominal: Soft. She exhibits no distension. There is no tenderness.   Musculoskeletal: She exhibits no edema or tenderness.   Neurological: She is alert and oriented to person, place, and time.   Skin: Skin is warm and dry. She is not diaphoretic.   Psychiatric: She has a normal mood and affect. Her behavior is normal.       Recent Labs      12/31/17   1440  12/31/17   1815  01/01/18   0335   WBC  5.5  5.4  4.5*   RBC  5.63*  5.09  4.34   HEMOGLOBIN  14.3  13.0  11.2*   HEMATOCRIT  46.6  42.6  35.3*   MCV  82.8  83.7  81.3*   MCH  25.4*  25.5*  25.8*   MCHC  30.7*  30.5*  31.7*   RDW  43.8  43.8  42.7   PLATELETCT  263  238  216   MPV  9.5  10.0  9.5     Recent Labs      01/01/18   1928  01/02/18   0001  01/02/18   0245   SODIUM  139  136  138   POTASSIUM  3.1*  3.5*  3.3*   CHLORIDE  113*  113*  113*   CO2  16*  15*  17*   GLUCOSE  120*  98  139*   BUN  3*  <3*  <3*   CREATININE  0.49*  0.40*  0.39*   CALCIUM  7.1*  6.9*  7.1*         Recent Labs      12/31/17   1440   BNPBTYPENAT  3     Recent Labs      01/01/18   0335   TRIGLYCERIDE  140   HDL  23*   LDL  75          Assessment/Plan     * DKA (diabetic ketoacidoses) (CMS-[facility])- (present on admission)   Assessment & Plan    Admitted with a severe acidosis  Likely due to non-compliance/difficulty obtaining medications and influenza  Pregnancy test was negative.  s/p DKA protocol with titrating insulin drip based on  glucose levels and BMP's  Anion gap and bicarb have normalized thus transition to Lantus and sliding scale.  She was using her mother's insulin at home. She has been on between 20 and 50 units of Lantus in the past.                Hypophosphatemia- (present on admission)   Assessment & Plan    Critically low hypophosphatemia which was replaced        Influenza- (present on admission)   Assessment & Plan    Influenza A positive. Tamiflu for 5 days.        Uncontrolled diabetes mellitus (CMS-[facility])- (present on admission)   Assessment & Plan    Complications include peripheral neuropathy  Diabetes education consulted.  Add metformin due to weight of 83 kg and Lantus 30 units daily.  HbA1c is 11.6          Noncompliance with medications- (present on admission)   Assessment & Plan    Consult with social work and diabetes education             Reviewed items::  Labs reviewed and Medications reviewed  Contreras catheter::  No Contreras  DVT prophylaxis pharmacological::  Enoxaparin (Lovenox)

## 2018-01-03 VITALS
HEART RATE: 71 BPM | BODY MASS INDEX: 31.2 KG/M2 | WEIGHT: 182.76 LBS | RESPIRATION RATE: 17 BRPM | TEMPERATURE: 98.2 F | SYSTOLIC BLOOD PRESSURE: 119 MMHG | DIASTOLIC BLOOD PRESSURE: 75 MMHG | HEIGHT: 64 IN | OXYGEN SATURATION: 94 %

## 2018-01-03 LAB
ANION GAP SERPL CALC-SCNC: 9 MMOL/L (ref 0–11.9)
ANISOCYTOSIS BLD QL SMEAR: ABNORMAL
BASOPHILS # BLD AUTO: 0 % (ref 0–1.8)
BASOPHILS # BLD: 0 K/UL (ref 0–0.12)
BUN SERPL-MCNC: 5 MG/DL (ref 8–22)
CALCIUM SERPL-MCNC: 8.9 MG/DL (ref 8.5–10.5)
CHLORIDE SERPL-SCNC: 105 MMOL/L (ref 96–112)
CO2 SERPL-SCNC: 21 MMOL/L (ref 20–33)
CREAT SERPL-MCNC: 0.5 MG/DL (ref 0.5–1.4)
EOSINOPHIL # BLD AUTO: 0 K/UL (ref 0–0.51)
EOSINOPHIL NFR BLD: 0 % (ref 0–6.9)
ERYTHROCYTE [DISTWIDTH] IN BLOOD BY AUTOMATED COUNT: 43 FL (ref 35.9–50)
GFR SERPL CREATININE-BSD FRML MDRD: >60 ML/MIN/1.73 M 2
GLUCOSE BLD-MCNC: 107 MG/DL (ref 65–99)
GLUCOSE BLD-MCNC: 175 MG/DL (ref 65–99)
GLUCOSE BLD-MCNC: 224 MG/DL (ref 65–99)
GLUCOSE BLD-MCNC: 319 MG/DL (ref 65–99)
GLUCOSE BLD-MCNC: 346 MG/DL (ref 65–99)
GLUCOSE BLD-MCNC: 90 MG/DL (ref 65–99)
GLUCOSE BLD-MCNC: 93 MG/DL (ref 65–99)
GLUCOSE SERPL-MCNC: 391 MG/DL (ref 65–99)
HCT VFR BLD AUTO: 36.2 % (ref 37–47)
HGB BLD-MCNC: 11.8 G/DL (ref 12–16)
LYMPHOCYTES # BLD AUTO: 2.38 K/UL (ref 1–4.8)
LYMPHOCYTES NFR BLD: 51.8 % (ref 22–41)
MACROCYTES BLD QL SMEAR: ABNORMAL
MAGNESIUM SERPL-MCNC: 2 MG/DL (ref 1.5–2.5)
MANUAL DIFF BLD: NORMAL
MCH RBC QN AUTO: 25.9 PG (ref 27–33)
MCHC RBC AUTO-ENTMCNC: 32.6 G/DL (ref 33.6–35)
MCV RBC AUTO: 79.6 FL (ref 81.4–97.8)
MICROCYTES BLD QL SMEAR: ABNORMAL
MONOCYTES # BLD AUTO: 0.33 K/UL (ref 0–0.85)
MONOCYTES NFR BLD AUTO: 7.1 % (ref 0–13.4)
MORPHOLOGY BLD-IMP: NORMAL
NEUTROPHILS # BLD AUTO: 1.89 K/UL (ref 2–7.15)
NEUTROPHILS NFR BLD: 40.2 % (ref 44–72)
NEUTS BAND NFR BLD MANUAL: 0.9 % (ref 0–10)
NRBC # BLD AUTO: 0 K/UL
NRBC BLD-RTO: 0 /100 WBC
PLATELET # BLD AUTO: 179 K/UL (ref 164–446)
PLATELET BLD QL SMEAR: NORMAL
PMV BLD AUTO: 9.9 FL (ref 9–12.9)
POTASSIUM SERPL-SCNC: 4 MMOL/L (ref 3.6–5.5)
RBC # BLD AUTO: 4.55 M/UL (ref 4.2–5.4)
RBC BLD AUTO: PRESENT
SODIUM SERPL-SCNC: 135 MMOL/L (ref 135–145)
VARIANT LYMPHS BLD QL SMEAR: NORMAL
WBC # BLD AUTO: 4.6 K/UL (ref 4.8–10.8)

## 2018-01-03 PROCEDURE — 85027 COMPLETE CBC AUTOMATED: CPT

## 2018-01-03 PROCEDURE — 80048 BASIC METABOLIC PNL TOTAL CA: CPT

## 2018-01-03 PROCEDURE — 700102 HCHG RX REV CODE 250 W/ 637 OVERRIDE(OP): Performed by: INTERNAL MEDICINE

## 2018-01-03 PROCEDURE — 85007 BL SMEAR W/DIFF WBC COUNT: CPT

## 2018-01-03 PROCEDURE — 99239 HOSP IP/OBS DSCHRG MGMT >30: CPT | Performed by: INTERNAL MEDICINE

## 2018-01-03 PROCEDURE — 83735 ASSAY OF MAGNESIUM: CPT

## 2018-01-03 PROCEDURE — 82962 GLUCOSE BLOOD TEST: CPT | Mod: 91

## 2018-01-03 PROCEDURE — A9270 NON-COVERED ITEM OR SERVICE: HCPCS | Performed by: INTERNAL MEDICINE

## 2018-01-03 PROCEDURE — 36415 COLL VENOUS BLD VENIPUNCTURE: CPT

## 2018-01-03 RX ORDER — INSULIN GLARGINE 100 [IU]/ML
37 INJECTION, SOLUTION SUBCUTANEOUS
Status: DISCONTINUED | OUTPATIENT
Start: 2018-01-03 | End: 2018-01-03 | Stop reason: HOSPADM

## 2018-01-03 RX ORDER — OSELTAMIVIR PHOSPHATE 75 MG/1
75 CAPSULE ORAL EVERY 12 HOURS
Qty: 10 CAP | Refills: 0 | Status: SHIPPED | OUTPATIENT
Start: 2018-01-03

## 2018-01-03 RX ORDER — INSULIN GLARGINE 100 [IU]/ML
37 INJECTION, SOLUTION SUBCUTANEOUS EVERY EVENING
Qty: 10 ML | Refills: 0 | Status: SHIPPED | OUTPATIENT
Start: 2018-01-03

## 2018-01-03 RX ORDER — VALACYCLOVIR HYDROCHLORIDE 500 MG/1
500 TABLET, FILM COATED ORAL 2 TIMES DAILY
Qty: 40 TAB | Refills: 0 | Status: SHIPPED | OUTPATIENT
Start: 2018-01-03

## 2018-01-03 RX ADMIN — OSELTAMIVIR PHOSPHATE 75 MG: 75 CAPSULE ORAL at 08:51

## 2018-01-03 RX ADMIN — INSULIN GLARGINE 37 UNITS: 100 INJECTION, SOLUTION SUBCUTANEOUS at 12:49

## 2018-01-03 RX ADMIN — INSULIN HUMAN 6 UNITS: 100 INJECTION, SOLUTION PARENTERAL at 08:51

## 2018-01-03 RX ADMIN — INSULIN HUMAN 6 UNITS: 100 INJECTION, SOLUTION PARENTERAL at 12:45

## 2018-01-03 RX ADMIN — VALACYCLOVIR 500 MG: 500 TABLET, FILM COATED ORAL at 08:51

## 2018-01-03 ASSESSMENT — LIFESTYLE VARIABLES: DO YOU DRINK ALCOHOL: NO

## 2018-01-03 ASSESSMENT — PAIN SCALES - GENERAL
PAINLEVEL_OUTOF10: 0
PAINLEVEL_OUTOF10: 0

## 2018-01-03 NOTE — CONSULTS
Diabetes education: Met with Katrina regarding diabetes management. Pt has a hx of type one diabetes, has Medicaid [employer]PN for insurance, no PCP and was using her mother's insulin. Pt states she has a One touch ultra mini meter with some strips.  Pt is to be discharged today. In order for her Medicaid to cover medications and supplies the prescriptions need to be written out specifically.  Pt knows she needs a PCP and states she will call [facility] tomorrow am as her children go there.  Plan: Pt needs prescriptions for Basaglar ( the glargine covered by HPN) kwik pen (box of 5), luisa pen needles ( 4 mm box of 100), either Humalog or Regular vials ( if regular please put Novolin/Humulin on prescription) with sliding scale written out and for ac only, syringes, 3/10 cc box of 100, One touch ultra test strips and delica lancets to test 4 times a day. All prescriptions need to have directions, quantity, refills and dx code ( E10.65) for Medicaid to cover. Please call [phone] if needs change.

## 2018-01-03 NOTE — PROGRESS NOTES
Pt transport transferring patient to [facility] with belongings and purse on pt lap.

## 2018-01-03 NOTE — CARE PLAN
Problem: Infection  Goal: Will remain free from infection    Intervention: Assess signs and symptoms of infection  Pt showing no s/s of infection at this time.       Problem: Discharge Barriers/Planning  Goal: Patient's continuum of care needs will be met    Intervention: Explain discharge instructions and medication reconcilliation to patient and significant other/support system  Pt has received diabetic education and is ready to discharge home.

## 2018-01-03 NOTE — PROGRESS NOTES
Pt left behind Rx for diabetic supplies.  Left message with phone # on facesheet.  Will keep Rx at front counter with charge until end of shift.

## 2018-01-03 NOTE — DISCHARGE INSTRUCTIONS
Discharge Instructions    Discharged to home by car with relative. Discharged via walking, hospital escort: Refused.  Special equipment needed: Not Applicable    Be sure to schedule a follow-up appointment with your primary care doctor or any specialists as instructed.     Discharge Plan:   Diet Plan: Discussed  Activity Level: Discussed  Confirmed Follow up Appointment: Appointment Scheduled  Confirmed Symptoms Management: Discussed  Medication Reconciliation Updated: No (Comments)  Pneumococcal Vaccine Given - only chart on this line when given: Given (See MAR)  Influenza Vaccine Indication: Indicated: 9 to 64 years of age  Influenza Vaccine Given - only chart on this line when given: Influenza Vaccine Given (See MAR)    I understand that a diet low in cholesterol, fat, and sodium is recommended for good health. Unless I have been given specific instructions below for another diet, I accept this instruction as my diet prescription.   Other diet: diabetic    Special Instructions: Sepsis, Adult  Sepsis is a serious infection of your blood or tissues that affects your whole body. The infection that causes sepsis may be bacterial, viral, fungal, or parasitic. Sepsis may be life threatening. Sepsis can cause your blood pressure to drop. This may result in shock. Shock causes your central nervous system and your organs to stop working correctly.   RISK FACTORS  Sepsis can happen in anyone, but it is more likely to happen in people who have weakened immune systems.  SIGNS AND SYMPTOMS   Symptoms of sepsis can include:  · Fever or low body temperature (hypothermia).  · Rapid breathing (hyperventilation).  · Chills.  · Rapid heartbeat (tachycardia).  · Confusion or light-headedness.  · Trouble breathing.  · Urinating much less than usual.  · Cool, clammy skin or red, flushed skin.  · Other problems with the heart, kidneys, or brain.  DIAGNOSIS   Your health care provider will likely do tests to look for an infection, to  see if the infection has spread to your blood, and to see how serious your condition is. Tests can include:  · Blood tests, including cultures of your blood.  · Cultures of other fluids from your body, such as:  ¨ Urine.  ¨ Pus from wounds.  ¨ Mucus coughed up from your lungs.  · Urine tests other than cultures.  · X-ray exams or other imaging tests.  TREATMENT   Treatment will begin with elimination of the source of infection. If your sepsis is likely caused by a bacterial or fungal infection, you will be given antibiotic or antifungal medicines.  You may also receive:  · Oxygen.  · Fluids through an IV tube.  · Medicines to increase your blood pressure.  · A machine to clean your blood (dialysis) if your kidneys fail.  · A machine to help you breathe if your lungs fail.  SEEK IMMEDIATE MEDICAL CARE IF:  You get an infection or develop any of the signs and symptoms of sepsis after surgery or a hospitalization.     This information is not intended to replace advice given to you by your health care provider. Make sure you discuss any questions you have with your health care provider.     Document Released: 09/15/2004 Document Revised: 05/03/2016 Document Reviewed: 08/25/2014  [employer] Interactive Patient Education ©2016 [employer]      · Is patient discharged on Warfarin / Coumadin?   No     · Is patient Post Blood Transfusion?  No      Pyelonephritis, Adult  Pyelonephritis is a kidney infection. A kidney infection can happen quickly, or it can last for a long time.  HOME CARE   · Take your medicine (antibiotics) as told. Finish it even if you start to feel better.  · Keep all doctor visits as told.  · Drink enough fluids to keep your pee (urine) clear or pale yellow.  · Only take medicine as told by your doctor.  GET HELP RIGHT AWAY IF:   · You have a fever or lasting symptoms for more than 2-3 days.  · You have a fever and your symptoms suddenly get worse.  · You cannot take your medicine or drink fluids as  told.  · You have chills and shaking.  · You feel very weak or pass out (faint).  · You do not feel better after 2 days.  MAKE SURE YOU:  · Understand these instructions.  · Will watch your condition.  · Will get help right away if you are not doing well or get worse.     This information is not intended to replace advice given to you by your health care provider. Make sure you discuss any questions you have with your health care provider.     Document Released: 01/25/2006 Document Revised: 01/08/2016 Document Reviewed: 06/06/2012  [employer] Interactive Patient Education ©2016 [employer].        Urinary Tract Infection  A urinary tract infection (UTI) can occur any place along the urinary tract. The tract includes the kidneys, ureters, bladder, and urethra. A type of germ called bacteria often causes a UTI. UTIs are often helped with antibiotic medicine.   HOME CARE   · If given, take antibiotics as told by your doctor. Finish them even if you start to feel better.  · Drink enough fluids to keep your pee (urine) clear or pale yellow.  · Avoid tea, drinks with caffeine, and bubbly (carbonated) drinks.  · Pee often. Avoid holding your pee in for a long time.  · Pee before and after having sex (intercourse).  · Wipe from front to back after you poop (bowel movement) if you are a woman. Use each tissue only once.  GET HELP RIGHT AWAY IF:   · You have back pain.  · You have lower belly (abdominal) pain.  · You have chills.  · You feel sick to your stomach (nauseous).  · You throw up (vomit).  · Your burning or discomfort with peeing does not go away.  · You have a fever.  · Your symptoms are not better in 3 days.  MAKE SURE YOU:   · Understand these instructions.  · Will watch your condition.  · Will get help right away if you are not doing well or get worse.     This information is not intended to replace advice given to you by your health care provider. Make sure you discuss any questions you have with your health  care provider.     Document Released: 06/05/2009 Document Revised: 01/08/2016 Document Reviewed: 07/18/2013  [employer] Interactive Patient Education ©2016 [employer]    Diabetic Ketoacidosis  Diabetic ketoacidosis is a life-threatening complication of diabetes. If it is not treated, it can cause severe dehydration and organ damage and can lead to a coma or death.  CAUSES  This condition develops when there is not enough of the hormone insulin in the body. Insulin helps the body to break down sugar for energy. Without insulin, the body cannot break down sugar, so it breaks down fats instead. This leads to the production of acids that are called ketones. Ketones are poisonous at high levels.  This condition can be triggered by:  · Stress on the body that is brought on by an illness.  · Medicines that raise blood glucose levels.  · Not taking diabetes medicine.  SYMPTOMS  Symptoms of this condition include:  · Fatigue.  · Weight loss.  · Excessive thirst.  · Light-headedness.  · Fruity or sweet-smelling breath.  · Excessive urination.  · Vision changes.  · Confusion or irritability.  · Nausea.  · Vomiting.  · Rapid breathing.  · Abdominal pain.  · Feeling flushed.  DIAGNOSIS  This condition is diagnosed based on a medical history, a physical exam, and blood tests. You may also have a urine test that checks for ketones.  TREATMENT  This condition may be treated with:  · Fluid replacement. This may be done to correct dehydration.  · Insulin injections. These may be given through the skin or through an IV tube.  · Electrolyte replacement. Electrolytes, such as potassium and sodium, may be given in pill form or through an IV tube.  · Antibiotic medicines. These may be prescribed if your condition was caused by an infection.  HOME CARE INSTRUCTIONS  Eating and Drinking  · Drink enough fluids to keep your urine clear or pale yellow.  · If you cannot eat, alternate between drinking fluids with sugar (such as juice) and  "salty fluids (such as broth or bouillon).  · If you can eat, follow your usual diet and drink sugar-free liquids, such as water.  Other Instructions  · Take insulin as directed by your health care provider. Do not skip insulin injections. Do not use  insulin.  · If your blood sugar is over 240 mg/dL, monitor your urine ketones every 4-6 hours.  · If you were prescribed an antibiotic medicine, finish all of it even if you start to feel better.  · Rest and exercise only as directed by your health care provider.  · If you get sick, call your health care provider and begin treatment quickly. Your body often needs extra insulin to fight an illness.  · Check your blood glucose levels regularly. If your blood glucose is high, drink plenty of fluids. This helps to flush out ketones.  SEEK MEDICAL CARE IF:  · Your blood glucose level is too high or too low.  · You have ketones in your urine.  · You have a fever.  · You cannot eat.  · You cannot tolerate fluids.  · You have been vomiting for more than 2 hours.  · You continue to have symptoms of this condition.  · You develop new symptoms.  SEEK IMMEDIATE MEDICAL CARE IF:  · Your blood glucose levels continue to be high (elevated).  · Your monitor reads \"high\" even when you are taking insulin.  · You faint.  · You have chest pain.  · You have trouble breathing.  · You have a sudden, severe headache.  · You have sudden weakness in one arm or one leg.  · You have sudden trouble speaking or swallowing.  · You have vomiting or diarrhea that gets worse after 3 hours.  · You feel severely fatigued.  · You have trouble thinking.  · You have abdominal pain.  · You are severely dehydrated. Symptoms of severe dehydration include:  ¨ Extreme thirst.  ¨ Dry mouth.  ¨ Blue lips.  ¨ Cold hands and feet.  ¨ Rapid breathing.     This information is not intended to replace advice given to you by your health care provider. Make sure you discuss any questions you have with your health " care provider.     Document Released: 12/15/2001 Document Revised: 05/03/2016 Document Reviewed: 11/25/2015  [employer] Interactive Patient Education ©2016 [employer].        Depression / Suicide Risk    As you are discharged from this [facility] facility, it is important to learn how to keep safe from harming yourself.    Recognize the warning signs:  · Abrupt changes in personality, positive or negative- including increase in energy   · Giving away possessions  · Change in eating patterns- significant weight changes-  positive or negative  · Change in sleeping patterns- unable to sleep or sleeping all the time   · Unwillingness or inability to communicate  · Depression  · Unusual sadness, discouragement and loneliness  · Talk of wanting to die  · Neglect of personal appearance   · Rebelliousness- reckless behavior  · Withdrawal from people/activities they love  · Confusion- inability to concentrate     If you or a loved one observes any of these behaviors or has concerns about self-harm, here's what you can do:  · Talk about it- your feelings and reasons for harming yourself  · Remove any means that you might use to hurt yourself (examples: pills, rope, extension cords, firearm)  · Get professional help from the community (Mental Health, Substance Abuse, psychological counseling)  · Do not be alone:Call your Safe Contact- someone whom you trust who will be there for you.  · Call your local CRISIS HOTLINE 311-2855 or 203-294-1834  · Call your local Children's Mobile Crisis Response Team [place] (463) 791-2745 or www.[employer]  · Call the toll free National Suicide Prevention Hotlines   · National Suicide Prevention Lifeline 631-950-MELV (7031)  · National Hope Line Network 800-SUICIDE (568-6171)

## 2018-01-03 NOTE — PROGRESS NOTES
Pt has been given discharge paperwork and prescriptions.  Pt verbalized understanding of paperwork and of additions/changes to medication regimen.  Pt PIV d/c'd, tip intact.  Pt leaving via self  to home.

## 2018-01-03 NOTE — PROGRESS NOTES
Pt transferred to unit via wheelchair, escorted by pt transport. Assumed pt care. Assessment completed (agree with assessment from previous unit SICU RN). AA&OX4. Denies pain at this time. No s/s of discomfort or distress. Pt is up self, ambulates to the bathroom and maintains steady gait. Oriented pt to S522, explained call light use, smoking policy, visiting hours, plan of care. Pt verbalized understanding. Bed in lowest position, bed locked, treaded socks in place, RN and CNA numbers provided, call light within reach.

## 2018-01-03 NOTE — PROGRESS NOTES
Yaneth Gannon Fall Risk Assessment:     Last Known Fall: No falls  Mobility: No limitations  Medications: No meds  Mental Status/LOC/Awareness: Awake, alert, and oriented to date, place, and person  Toileting Needs: No needs  Volume/Electrolyte Status: No problems  Communication/Sensory: No deficits  Behavior: Appropriate behavior  Yaneth Gannon Fall Risk Total: 1  Fall Risk Level: NO RISK    Universal Fall Precautions:  call light/belongings in reach, bed in low position and locked, wheelchairs and assistive devices out of sight, siderails up x 2, use non-slip footwear, adequate lighting, clutter free and spill free environment, educate on level of risk, educate to call for assistance    Fall Risk Level Interventions:          Patient Specific Interventions:     Medication: review medications with patient and family  Mental Status/LOC/Awareness: check on patient hourly and reinforce the use of call light  Toileting: provide frquent toileting  Volume/Electrolyte Status: monitor abnormal lab values  Communication/Sensory: update plan of care on whiteboard  Behavioral: administer medication as ordered  Mobility: ensure bed is locked and in lowest position

## 2018-01-03 NOTE — CARE PLAN
Problem: Infection  Goal: Will remain free from infection    Intervention: Implement standard precautions and perform hand washing before and after patient contact  Droplet precautions in place for influenza A.       Problem: Venous Thromboembolism (VTW)/Deep Vein Thrombosis (DVT) Prevention:  Goal: Patient will participate in Venous Thrombosis (VTE)/Deep Vein Thrombosis (DVT)Prevention Measures    Intervention: Assess and monitor for anticoagulation complications  Pt receives Lovenox for VTE prophylaxis.

## 2018-01-03 NOTE — PROGRESS NOTES
Diabetes education: Met with patient this am . Please see consult note.  Plan: Pt needs prescriptions for Basaglar ( the glargine covered by [facility]) kwik pen (box of 5), luisa pen needles ( 4 mm box of 100), either Humalog or Regular vials ( if regular please put Novolin/Humulin on prescription) with sliding scale written out and for ac only, syringes, 3/10 cc box of 100, One touch ultra test strips and delica lancets to test 4 times a day. All prescriptions need to have directions, quantity, refills and dx code ( E10.65) for Medicaid to cover. Please call [phone] if needs change.

## 2018-01-04 NOTE — DISCHARGE SUMMARY
CHIEF COMPLAINT ON ADMISSION  Chief Complaint   Patient presents with   • Palpitations     intermittent episodes since yesterday.        CODE STATUS  Full    HPI & HOSPITAL COURSE  This is a 27 y.o. Female With history of type I diabetes presented on December 31 with complaints of palpitation, nausea, vomiting, abdominal pain. For details see H&P note  She found to have diabetic ketoacidosis secondary to medication noncompliance as well as influenza A infection. It was treated per protocol with resolution. Patient received Tamiflu course for 5 days.  Diabetes education provided in the hospital. Insulin and diabetes supplies prescriptions provided upon discharge.    Therefore, she is discharged in good and stable condition with close outpatient follow-up.    SPECIFIC OUTPATIENT FOLLOW-UP  PCP    DISCHARGE PROBLEM LIST  Principal Problem:    DKA (diabetic ketoacidoses) (CMS-HCC) POA: Yes  Active Problems:    Uncontrolled diabetes mellitus (CMS-HCC) POA: Yes    Influenza POA: Yes    Hypophosphatemia POA: Yes    Noncompliance with medications POA: Yes  Resolved Problems:    Tachycardia POA: Yes      FOLLOW UP  Future Appointments  Date Time Provider Department Center   1/8/2018 7:50 AM Jourdan Rogers M.D. [facility]     No follow-up provider specified.    MEDICATIONS ON DISCHARGE   Katrina Jacinto   Home Medication Instructions SOSA:[id number]    Printed on:01/03/18 1814   Medication Information                      insulin glargine (LANTUS) 100 UNIT/ML Solution  Inject 37 Units as instructed every evening.             insulin regular (HUMULIN R) 100 Unit/mL Solution  Inject 2-9 Units as instructed 4 Times a Day,Before Meals and at Bedtime.             oseltamivir (TAMIFLU) 75 MG Cap  Take 1 Cap by mouth every 12 hours.             valacyclovir (VALTREX) 500 MG Tab  Take 1 Tab by mouth 2 Times a Day.                 DIET  No orders of the defined types were placed in this encounter.      ACTIVITY  As tolerated.  Weight  bearing as tolerated      CONSULTATIONS  Critical care medicine    PROCEDURES  None    LABORATORY  Lab Results   Component Value Date/Time    SODIUM 135 01/03/2018 10:00 AM    POTASSIUM 4.0 01/03/2018 10:00 AM    CHLORIDE 105 01/03/2018 10:00 AM    CO2 21 01/03/2018 10:00 AM    GLUCOSE 391 (H) 01/03/2018 10:00 AM    BUN 5 (L) 01/03/2018 10:00 AM    CREATININE 0.50 01/03/2018 10:00 AM        Lab Results   Component Value Date/Time    WBC 4.6 (L) 01/03/2018 10:00 AM    HEMOGLOBIN 11.8 (L) 01/03/2018 10:00 AM    HEMATOCRIT 36.2 (L) 01/03/2018 10:00 AM    PLATELETCT 179 01/03/2018 10:00 AM      DX-CHEST-2 VIEWS   Final Result      No active disease.          Total time of the discharge process exceeds 45 minutes

## 2019-11-15 ENCOUNTER — PATIENT OUTREACH (OUTPATIENT)
Dept: OTHER | Facility: MEDICAL CENTER | Age: 29
End: 2019-11-15

## 2019-11-15 NOTE — PROGRESS NOTES
Oncology nurse navigator left a voicemail to follow up with the patient on the treatment summary plan to review it with the patient or schedule a meeting.

## 2019-11-21 ENCOUNTER — PATIENT OUTREACH (OUTPATIENT)
Dept: OTHER | Facility: MEDICAL CENTER | Age: 29
End: 2019-11-21

## 2019-11-21 NOTE — PROGRESS NOTES
Oncology nurse navigator made 2nd attempt to call patient to review her treatment summary with the patient.  Oncology nurse navigator left contact number and a message regarding the reason for the call.